# Patient Record
Sex: FEMALE | Race: WHITE | NOT HISPANIC OR LATINO | Employment: OTHER | URBAN - METROPOLITAN AREA
[De-identification: names, ages, dates, MRNs, and addresses within clinical notes are randomized per-mention and may not be internally consistent; named-entity substitution may affect disease eponyms.]

---

## 2017-02-13 ENCOUNTER — OFFICE VISIT (OUTPATIENT)
Dept: INTERNAL MEDICINE | Facility: CLINIC | Age: 47
End: 2017-02-13

## 2017-02-13 ENCOUNTER — APPOINTMENT (OUTPATIENT)
Dept: WOMENS IMAGING | Facility: HOSPITAL | Age: 47
End: 2017-02-13

## 2017-02-13 VITALS
BODY MASS INDEX: 24.3 KG/M2 | TEMPERATURE: 99.5 F | WEIGHT: 135 LBS | DIASTOLIC BLOOD PRESSURE: 64 MMHG | OXYGEN SATURATION: 99 % | SYSTOLIC BLOOD PRESSURE: 106 MMHG | HEART RATE: 90 BPM

## 2017-02-13 DIAGNOSIS — R05.9 COUGH: Primary | ICD-10-CM

## 2017-02-13 DIAGNOSIS — R50.9 FEVER, UNSPECIFIED FEVER CAUSE: ICD-10-CM

## 2017-02-13 DIAGNOSIS — J18.9 PNEUMONIA OF LEFT LUNG DUE TO INFECTIOUS ORGANISM, UNSPECIFIED PART OF LUNG: ICD-10-CM

## 2017-02-13 PROCEDURE — 99214 OFFICE O/P EST MOD 30 MIN: CPT | Performed by: NURSE PRACTITIONER

## 2017-02-13 PROCEDURE — 71020 XR CHEST 2 VW: CPT | Performed by: NURSE PRACTITIONER

## 2017-02-13 PROCEDURE — 71020 CHG CHEST X-RAY 2 VW: CPT | Performed by: RADIOLOGY

## 2017-02-13 RX ORDER — AZITHROMYCIN 250 MG/1
TABLET, FILM COATED ORAL
COMMUNITY
Start: 2017-02-11 | End: 2017-02-13

## 2017-02-13 RX ORDER — BENZONATATE 100 MG/1
1 CAPSULE ORAL EVERY 8 HOURS PRN
COMMUNITY
Start: 2017-02-11 | End: 2017-06-20

## 2017-02-13 RX ORDER — DEXTROMETHORPHAN HYDROBROMIDE AND PROMETHAZINE HYDROCHLORIDE 15; 6.25 MG/5ML; MG/5ML
5 SYRUP ORAL NIGHTLY PRN
COMMUNITY
Start: 2017-02-11 | End: 2017-06-20

## 2017-02-13 RX ORDER — LEVOFLOXACIN 500 MG/1
500 TABLET, FILM COATED ORAL DAILY
Qty: 5 TABLET | Refills: 0 | Status: SHIPPED | OUTPATIENT
Start: 2017-02-13 | End: 2017-02-18

## 2017-02-13 NOTE — PROGRESS NOTES
Subjective   Irma Esposito is a 46 y.o. female.     HPI Comments: She went to [facility] on 2/11/17 and was diagnosed bronchitis. She was tested for flu which was negative. She was started on zpak. She has a hx of pneumonia in childhood.     URI    This is a new problem. Episode onset: 5 days ago  The problem has been unchanged. The maximum temperature recorded prior to her arrival was 103 - 104 F. The fever has been present for 3 to 4 days. Associated symptoms include congestion, coughing, headaches and sinus pain. Pertinent negatives include no abdominal pain, chest pain, diarrhea, ear pain, nausea, neck pain, plugged ear sensation, rhinorrhea, sneezing, sore throat, vomiting or wheezing. She has tried acetaminophen (flonase, tessalon pearles, mucinex, promethazine cough suppressant, z-aroldo ) for the symptoms. The treatment provided mild relief.   Fever    This is a new problem. The temperature was taken using an oral thermometer. Associated symptoms include congestion, coughing and headaches. Pertinent negatives include no abdominal pain, chest pain, diarrhea, ear pain, nausea, sore throat, vomiting or wheezing. She has tried acetaminophen for the symptoms. The treatment provided mild relief.   Fatigue   This is a new problem. Episode onset: 5 days ago  The problem occurs constantly. Associated symptoms include congestion, coughing, fatigue, a fever, headaches and myalgias. Pertinent negatives include no abdominal pain, chest pain, chills, nausea, neck pain, sore throat or vomiting.   Headache    This is a new problem. The problem has been waxing and waning. The pain is located in the right unilateral region. The pain does not radiate. Associated symptoms include coughing, a fever and sinus pressure (?). Pertinent negatives include no abdominal pain, ear pain, hearing loss, nausea, neck pain, phonophobia, photophobia, rhinorrhea, sore throat, tinnitus or vomiting. The symptoms are aggravated by coughing  (standing up from sitting to standing ). She has tried acetaminophen and NSAIDs for the symptoms. The treatment provided mild relief.        The following portions of the patient's history were reviewed and updated as appropriate: allergies, current medications and problem list.    Review of Systems   Constitutional: Positive for fatigue and fever. Negative for appetite change and chills.   HENT: Positive for congestion, postnasal drip and sinus pressure (?). Negative for ear discharge, ear pain, facial swelling, hearing loss, rhinorrhea, sneezing, sore throat and tinnitus.    Eyes: Negative for photophobia.   Respiratory: Positive for cough. Negative for chest tightness, shortness of breath and wheezing.    Cardiovascular: Negative for chest pain, palpitations and leg swelling.   Gastrointestinal: Negative for abdominal pain, diarrhea, nausea and vomiting.   Musculoskeletal: Positive for myalgias. Negative for neck pain and neck stiffness.   Neurological: Positive for headaches.   Hematological: Negative for adenopathy.       Objective   Physical Exam   Constitutional: She appears well-developed and well-nourished. She is cooperative. She does not have a sickly appearance. She does not appear ill.   HENT:   Head: Normocephalic.   Right Ear: Hearing and external ear normal. No drainage, swelling or tenderness. No mastoid tenderness. Tympanic membrane is bulging. Tympanic membrane is not injected, not scarred and not erythematous. Tympanic membrane mobility is normal. No middle ear effusion. No decreased hearing is noted.   Left Ear: Hearing and external ear normal. No drainage, swelling or tenderness. No mastoid tenderness. Tympanic membrane is bulging. Tympanic membrane is not injected, not scarred and not erythematous. Tympanic membrane mobility is normal.  No middle ear effusion. No decreased hearing is noted.   Nose: Nose normal. No mucosal edema, rhinorrhea, sinus tenderness or nasal deformity. Right sinus  exhibits no maxillary sinus tenderness and no frontal sinus tenderness. Left sinus exhibits no maxillary sinus tenderness and no frontal sinus tenderness.   Mouth/Throat: Mucous membranes are normal. Normal dentition. Posterior oropharyngeal erythema present. No tonsillar exudate.   Eyes: Conjunctivae and lids are normal. Pupils are equal, round, and reactive to light. Right eye exhibits no discharge and no exudate. Left eye exhibits no discharge and no exudate.   Neck: Trachea normal and normal range of motion. No edema present. No thyroid mass and no thyromegaly present.   Cardiovascular: Regular rhythm, normal heart sounds and normal pulses.    No murmur heard.  Pulmonary/Chest: No respiratory distress. She has no decreased breath sounds. She has no wheezes. She has rhonchi in the right middle field. She has no rales.   Dry cough    Lymphadenopathy:        Head (right side): No submental, no submandibular, no tonsillar, no preauricular, no posterior auricular and no occipital adenopathy present.        Head (left side): No submental, no submandibular, no tonsillar, no preauricular, no posterior auricular and no occipital adenopathy present.   Neurological: She is alert.   Skin: Skin is warm, dry and intact. No cyanosis. Nails show no clubbing.       Assessment/Plan   Irma was seen today for uri, fever, fatigue and headache.    Diagnoses and all orders for this visit:    Cough  Comments:  continue with prescription cough suppressant prn  Orders:  -     XR Chest 2 View    Fever, unspecified fever cause  Comments:  ? pneumonia     Pneumonia of left lung due to infectious organism, unspecified part of lung  Comments:  continue with mucinex and prescription cough suppressant  Orders:  -     levoFLOXacin (LEVAQUIN) 500 MG tablet; Take 1 tablet by mouth Daily for 5 days.        CXR with questionable left infiltrate (reviewed with Dr Miranda). No comparison film available. Sent for prelim read (will probably need to  order CT scan).

## 2017-02-13 NOTE — PATIENT INSTRUCTIONS
Acute Bronchitis  Bronchitis is inflammation of the airways that extend from the windpipe into the lungs (bronchi). The inflammation often causes mucus to develop. This leads to a cough, which is the most common symptom of bronchitis.   In acute bronchitis, the condition usually develops suddenly and goes away over time, usually in a couple weeks. Smoking, allergies, and asthma can make bronchitis worse. Repeated episodes of bronchitis may cause further lung problems.   CAUSES  Acute bronchitis is most often caused by the same virus that causes a cold. The virus can spread from person to person (contagious) through coughing, sneezing, and touching contaminated objects.  SIGNS AND SYMPTOMS   · Cough.    · Fever.    · Coughing up mucus.    · Body aches.    · Chest congestion.    · Chills.    · Shortness of breath.    · Sore throat.    DIAGNOSIS   Acute bronchitis is usually diagnosed through a physical exam. Your health care provider will also ask you questions about your medical history. Tests, such as chest X-rays, are sometimes done to rule out other conditions.   TREATMENT   Acute bronchitis usually goes away in a couple weeks. Oftentimes, no medical treatment is necessary. Medicines are sometimes given for relief of fever or cough. Antibiotic medicines are usually not needed but may be prescribed in certain situations. In some cases, an inhaler may be recommended to help reduce shortness of breath and control the cough. A cool mist vaporizer may also be used to help thin bronchial secretions and make it easier to clear the chest.   HOME CARE INSTRUCTIONS  · Get plenty of rest.    · Drink enough fluids to keep your urine clear or pale yellow (unless you have a medical condition that requires fluid restriction). Increasing fluids may help thin your respiratory secretions (sputum) and reduce chest congestion, and it will prevent dehydration.    · Take medicines only as directed by your health care provider.  · If  you were prescribed an antibiotic medicine, finish it all even if you start to feel better.  · Avoid smoking and secondhand smoke. Exposure to cigarette smoke or irritating chemicals will make bronchitis worse. If you are a smoker, consider using nicotine gum or skin patches to help control withdrawal symptoms. Quitting smoking will help your lungs heal faster.    · Reduce the chances of another bout of acute bronchitis by washing your hands frequently, avoiding people with cold symptoms, and trying not to touch your hands to your mouth, nose, or eyes.    · Keep all follow-up visits as directed by your health care provider.    SEEK MEDICAL CARE IF:  Your symptoms do not improve after 1 week of treatment.   SEEK IMMEDIATE MEDICAL CARE IF:  · You develop an increased fever or chills.    · You have chest pain.    · You have severe shortness of breath.  · You have bloody sputum.    · You develop dehydration.  · You faint or repeatedly feel like you are going to pass out.  · You develop repeated vomiting.  · You develop a severe headache.  MAKE SURE YOU:   · Understand these instructions.  · Will watch your condition.  · Will get help right away if you are not doing well or get worse.     This information is not intended to replace advice given to you by your health care provider. Make sure you discuss any questions you have with your health care provider.     Document Released: 01/25/2006 Document Revised: 01/08/2016 Document Reviewed: 06/10/2014  [employer] Patient Education ©2016 [employer]

## 2017-02-15 DIAGNOSIS — J18.9 PNEUMONIA OF LEFT LUNG DUE TO INFECTIOUS ORGANISM, UNSPECIFIED PART OF LUNG: Primary | ICD-10-CM

## 2017-03-13 ENCOUNTER — APPOINTMENT (OUTPATIENT)
Dept: WOMENS IMAGING | Facility: HOSPITAL | Age: 47
End: 2017-03-13

## 2017-03-13 PROCEDURE — 71020 CHG CHEST X-RAY 2 VW: CPT | Performed by: RADIOLOGY

## 2017-06-19 ENCOUNTER — TELEPHONE (OUTPATIENT)
Dept: OBSTETRICS AND GYNECOLOGY | Age: 47
End: 2017-06-19

## 2017-06-19 NOTE — TELEPHONE ENCOUNTER
Called pt, reviewed u/s from [facility], she was not notified by [facility] of any finding, offered repeat u/s here and pt desires to proceed, note from [facility] advised repeat in 1 year but pt prefers to proceed now, will try to work in tomorrow

## 2017-06-20 ENCOUNTER — OFFICE VISIT (OUTPATIENT)
Dept: OBSTETRICS AND GYNECOLOGY | Age: 47
End: 2017-06-20

## 2017-06-20 ENCOUNTER — PROCEDURE VISIT (OUTPATIENT)
Dept: OBSTETRICS AND GYNECOLOGY | Age: 47
End: 2017-06-20

## 2017-06-20 ENCOUNTER — OFFICE VISIT (OUTPATIENT)
Dept: INTERNAL MEDICINE | Facility: CLINIC | Age: 47
End: 2017-06-20

## 2017-06-20 VITALS
BODY MASS INDEX: 23.57 KG/M2 | RESPIRATION RATE: 14 BRPM | SYSTOLIC BLOOD PRESSURE: 110 MMHG | DIASTOLIC BLOOD PRESSURE: 68 MMHG | WEIGHT: 133 LBS | HEIGHT: 63 IN

## 2017-06-20 VITALS
HEIGHT: 63 IN | WEIGHT: 131 LBS | BODY MASS INDEX: 23.21 KG/M2 | DIASTOLIC BLOOD PRESSURE: 70 MMHG | SYSTOLIC BLOOD PRESSURE: 98 MMHG

## 2017-06-20 DIAGNOSIS — N83.209 CYST OF OVARY, UNSPECIFIED LATERALITY: Primary | ICD-10-CM

## 2017-06-20 DIAGNOSIS — D75.89 MACROCYTOSIS: ICD-10-CM

## 2017-06-20 DIAGNOSIS — N83.202 CYST OF LEFT OVARY: Primary | ICD-10-CM

## 2017-06-20 DIAGNOSIS — Z00.00 ANNUAL PHYSICAL EXAM: Primary | ICD-10-CM

## 2017-06-20 LAB
ALBUMIN SERPL-MCNC: 4.64 G/DL (ref 3.4–4.6)
ALBUMIN/GLOB SERPL: 1.6 G/DL
ALP SERPL-CCNC: 62 U/L (ref 46–116)
ALT SERPL W P-5'-P-CCNC: 30 U/L (ref 14–59)
ANION GAP SERPL CALCULATED.3IONS-SCNC: 12 MMOL/L
APPEARANCE UR: CLEAR
AST SERPL-CCNC: 25 U/L (ref 7–37)
BASOPHILS # BLD AUTO: 0.01 10*3/MM3 (ref 0–0.2)
BASOPHILS NFR BLD AUTO: 0.2 % (ref 0–1.5)
BILIRUB SERPL-MCNC: 0.7 MG/DL (ref 0.2–1)
BILIRUB UR QL STRIP: NEGATIVE
BUN BLD-MCNC: 15 MG/DL (ref 6–22)
BUN/CREAT SERPL: 20.5 (ref 7–25)
CALCIUM SPEC-SCNC: 9.2 MG/DL (ref 8.6–10.5)
CHLORIDE SERPL-SCNC: 105 MMOL/L (ref 95–107)
CHOLEST SERPL-MCNC: 220 MG/DL (ref 0–200)
CO2 SERPL-SCNC: 28 MMOL/L (ref 23–32)
COLOR UR: YELLOW
CREAT BLD-MCNC: 0.73 MG/DL (ref 0.55–1.02)
EOSINOPHIL # BLD AUTO: 0.05 10*3/MM3 (ref 0–0.7)
EOSINOPHIL # BLD AUTO: 0.9 % (ref 0.3–6.2)
ERYTHROCYTE [DISTWIDTH] IN BLOOD BY AUTOMATED COUNT: 12.8 % (ref 11.7–13)
GFR SERPL CREATININE-BSD FRML MDRD: 85 ML/MIN/1.73
GLOBULIN UR ELPH-MCNC: 3 GM/DL
GLUCOSE BLD-MCNC: 112 MG/DL (ref 70–100)
GLUCOSE UR QL: NEGATIVE
HCT VFR BLD AUTO: 45.8 % (ref 35.6–45.5)
HDLC SERPL-MCNC: 93 MG/DL (ref 40–81)
HGB BLD-MCNC: 14.5 G/DL (ref 11.9–15.5)
HGB UR QL STRIP: NEGATIVE
IMM GRANULOCYTES # BLD: 0 10*3/MM3 (ref 0–0.03)
IMM GRANULOCYTES NFR BLD: 0 % (ref 0–0.5)
KETONES UR QL STRIP: NEGATIVE
LDLC SERPL CALC-MCNC: 114 MG/DL (ref 0–100)
LDLC/HDLC SERPL: 1.22 {RATIO}
LEUKOCYTE ESTERASE UR QL STRIP: NEGATIVE
LYMPHOCYTES # BLD AUTO: 1.4 10*3/MM3 (ref 0.9–4.8)
LYMPHOCYTES NFR BLD AUTO: 25.4 % (ref 19.6–45.3)
MCH RBC QN AUTO: 32.7 PG (ref 26.9–32)
MCHC RBC AUTO-ENTMCNC: 31.7 G/DL (ref 32.4–36.3)
MCV RBC AUTO: 103.2 FL (ref 80.5–98.2)
MONOCYTES # BLD AUTO: 0.5 10*3/MM3 (ref 0.2–1.2)
MONOCYTES NFR BLD AUTO: 9.1 % (ref 5–12)
NEUTROPHILS # BLD AUTO: 3.55 10*3/MM3 (ref 1.9–8.1)
NEUTROPHILS NFR BLD AUTO: 64.4 % (ref 42.7–76)
NITRITE UR QL STRIP: NEGATIVE
PH UR STRIP.AUTO: 7 [PH] (ref 5–8)
PLATELET # BLD AUTO: 232 10*3/MM3 (ref 140–500)
POTASSIUM BLD-SCNC: 4.3 MMOL/L (ref 3.3–5.3)
PROT SERPL-MCNC: 7.6 G/DL (ref 6.3–8.4)
PROTEIN: NEGATIVE
RBC # BLD AUTO: 4.44 10*6/MM3 (ref 3.9–5.2)
SODIUM BLD-SCNC: 145 MMOL/L (ref 136–145)
SP GR UR: 1.01 (ref 1–1.03)
TRIGL SERPL-MCNC: 66 MG/DL (ref 0–150)
TSH SERPL DL<=0.05 MIU/L-ACNC: 0.97 MIU/ML (ref 0.4–4.2)
UROBILINOGEN UR STRIP-MCNC: NORMAL MG/DL
VLDLC SERPL-MCNC: 13.2 MG/DL
WBC # BLD AUTO: 5.51 10*3/MM3 (ref 4.5–10.7)

## 2017-06-20 PROCEDURE — 76830 TRANSVAGINAL US NON-OB: CPT | Performed by: OBSTETRICS & GYNECOLOGY

## 2017-06-20 PROCEDURE — 80053 COMPREHEN METABOLIC PANEL: CPT | Performed by: INTERNAL MEDICINE

## 2017-06-20 PROCEDURE — 80061 LIPID PANEL: CPT | Performed by: INTERNAL MEDICINE

## 2017-06-20 PROCEDURE — 99396 PREV VISIT EST AGE 40-64: CPT | Performed by: INTERNAL MEDICINE

## 2017-06-20 PROCEDURE — 84443 ASSAY THYROID STIM HORMONE: CPT | Performed by: INTERNAL MEDICINE

## 2017-06-20 PROCEDURE — 99212 OFFICE O/P EST SF 10 MIN: CPT | Performed by: OBSTETRICS & GYNECOLOGY

## 2017-06-20 PROCEDURE — 93000 ELECTROCARDIOGRAM COMPLETE: CPT | Performed by: INTERNAL MEDICINE

## 2017-06-20 NOTE — PROGRESS NOTES
"Chief complaint:ovarian cyst     HPI  Irma Esposito is a 46 y.o. female. Pt with ovarian cyst noted on u/s at [facility] study. Pt was not notified but report noted left ovarian cyst 4.7 X 3.4 X 4.3 cm with septum. Plan per gyn onc was f/u 1 year. Contacted pt and offered repeat evaluation since it had been several months and pt desired to proceed rather than waiting a full year. She denies pelvic pain or abnormal bleeding. She is having irregular menses with occasional monthly cycles then will miss several months.         The following portions of the patient's history were reviewed and updated as appropriate: allergies, current medications, past family history, past medical history, past social history, past surgical history and problem list.    Review of Systems  Pertinent items are noted in HPI.    BP 98/70  Ht 62.5\" (158.8 cm)  Wt 131 lb (59.4 kg)  BMI 23.58 kg/m2    Objective   Physical Exam   Constitutional: She appears well-developed and well-nourished.   Psychiatric: She has a normal mood and affect. Her behavior is normal.     tv u/s: normal uterus with endometrial strip 6.5 mm, normal ovaries, left contains several small follicles, largest 7.7 X 6.5 mm, no free fluid or adnexal masses noted    Assessment/Plan   Irma was seen today for follow-up.    Diagnoses and all orders for this visit:    Cyst of left ovary      Cyst described by [facility] study resolved, normal ovaries today, pt asymptomatic  Recommended pt consider having My Risk done, she had BRCA testing in past but discussed adding additional studies and pt will continue, My Risk h/o given and pt will come in for draw if desired  She will f/u for annual in august or prn         "

## 2017-06-20 NOTE — PROGRESS NOTES
"Chief Complaint   Patient presents with   • Annual Exam        Subjective   Irma Esposito is a 46 y.o. female     HPI: She is here for annual examination.  She generally feels healthy.  Her  appetite is good.  She  is following a prudent diet. She is sleeping well.  Her  energy is good.   She exercises regularly.    Below right shoudler blade - tingling.  For about 3 months, off and on, No specific precipitating, aggravating or relieving factors.  Happens randomly. Area is fairly large.     Area on stomach - small bulge. Right side, upper abdomen, doesn't hurt.     Blug bites left ankle.     Tdap was given in 2009.       The following portions of the patient's history were reviewed and updated as appropriate: allergies, current medications, past social history, problem list, past surgical history    Review of Systems   Constitutional: Negative for appetite change, chills, fatigue, fever and unexpected weight change (she has lost some weight with diet and exercise).   HENT: Positive for tinnitus. Negative for congestion, ear pain, sinus pressure, sneezing, sore throat, trouble swallowing and voice change.    Eyes: Negative for visual disturbance.        Corneal abrasion \"years\" ago   Respiratory: Negative for cough and shortness of breath.    Cardiovascular: Negative for chest pain, palpitations and leg swelling.   Gastrointestinal: Positive for abdominal pain. Negative for constipation, diarrhea, nausea and vomiting.   Endocrine: Positive for cold intolerance. Negative for heat intolerance, polydipsia and polyuria.   Genitourinary: Positive for frequency. Negative for dysuria.   Musculoskeletal: Positive for arthralgias and back pain (low back off and on). Negative for gait problem.        Knees, elbows.  Glucosamine-chondroitin helps   Skin: Negative for rash.   Neurological: Negative for dizziness, syncope and headaches.   Hematological: Negative for adenopathy. Bruises/bleeds easily.   Psychiatric/Behavioral: " "Negative for decreased concentration, dysphoric mood and sleep disturbance. The patient is not nervous/anxious.            Objective     /68 (BP Location: Right arm, Patient Position: Sitting, Cuff Size: Adult)  Resp 14  Ht 62.5\" (158.8 cm)  Wt 133 lb (60.3 kg)  BMI 23.94 kg/m2     Physical Exam   Constitutional: She is oriented to person, place, and time. She appears well-developed and well-nourished. No distress.   HENT:   Right Ear: Hearing, tympanic membrane, external ear and ear canal normal.   Left Ear: Hearing, tympanic membrane, external ear and ear canal normal.   Nose: Right sinus exhibits no maxillary sinus tenderness and no frontal sinus tenderness. Left sinus exhibits no maxillary sinus tenderness and no frontal sinus tenderness.   Eyes: Conjunctivae, EOM and lids are normal. Pupils are equal, round, and reactive to light.   Neck: Trachea normal and phonation normal. Neck supple. Normal carotid pulses and no JVD present. Carotid bruit is not present. No thyroid mass and no thyromegaly present.   Cardiovascular: Normal rate, regular rhythm, S1 normal and S2 normal.  PMI is not displaced.  Exam reveals no gallop and no friction rub.    No murmur heard.  Pulses:       Carotid pulses are 2+ on the right side, and 2+ on the left side.       Radial pulses are 2+ on the right side, and 2+ on the left side.        Dorsalis pedis pulses are 2+ on the right side, and 2+ on the left side.   Pulmonary/Chest: Effort normal and breath sounds normal. She has no wheezes. She has no rhonchi. She has no rales. Chest wall is not dull to percussion.   Abdominal: Soft. Normal appearance, normal aorta and bowel sounds are normal. She exhibits no abdominal bruit and no mass. There is no hepatosplenomegaly. There is no tenderness.   Subtle asymmetry of upper abdomen with soft bulging in right upper abdomen   Musculoskeletal: Normal range of motion. She exhibits no edema, tenderness or deformity.   Lymphadenopathy:    "  She has no cervical adenopathy.     She has no axillary adenopathy.        Left: No supraclavicular adenopathy present.   Neurological: She is alert and oriented to person, place, and time. She has normal strength and normal reflexes. No cranial nerve deficit or sensory deficit. Coordination normal.   Skin: Skin is warm and dry. No rash noted.   Psychiatric: She has a normal mood and affect. Her speech is normal and behavior is normal. Judgment and thought content normal. Cognition and memory are normal. She is attentive.   Nursing note and vitals reviewed.      ECG 12 Lead  Date/Time: 6/20/2017 9:41 AM  Performed by: MARISOL YI  Authorized by: ROXANA SANDOVAL   Comparison: compared with previous ECG from 4/13/2015  Similar to previous ECG  Rhythm: sinus bradycardia  Rate: bradycardic  BPM: 51  Conduction: conduction normal  ST Segments: ST segments normal  T Waves: T waves normal  Clinical impression comment: Normal except for rate.  Compared with previous EKG, sinus bradycardia now present                 Assessment/Plan   Problem List Items Addressed This Visit     None      Visit Diagnoses     Annual physical exam    -  Primary    Relevant Orders    Comprehensive Metabolic Panel    CBC & Differential    Urinalysis With / Microscopic If Indicated    Lipid Panel    TSH        Likely asymmetry of subcutaneous fat causing subtle asymmetry of her upper abdomen.  Only visible when she is standing.

## 2017-06-21 LAB
FOLATE SERPL-MCNC: >20 NG/ML (ref 4.78–24.2)
VIT B12 SERPL-MCNC: 649 PG/ML (ref 211–946)

## 2017-08-25 ENCOUNTER — OFFICE VISIT (OUTPATIENT)
Dept: OBSTETRICS AND GYNECOLOGY | Age: 47
End: 2017-08-25

## 2017-08-25 VITALS
DIASTOLIC BLOOD PRESSURE: 60 MMHG | WEIGHT: 132 LBS | SYSTOLIC BLOOD PRESSURE: 100 MMHG | BODY MASS INDEX: 23.39 KG/M2 | HEIGHT: 63 IN

## 2017-08-25 DIAGNOSIS — Z01.419 VISIT FOR GYNECOLOGIC EXAMINATION: Primary | ICD-10-CM

## 2017-08-25 DIAGNOSIS — Z80.9 FAMILY HISTORY OF CANCER: ICD-10-CM

## 2017-08-25 DIAGNOSIS — Z11.51 SCREENING FOR HPV (HUMAN PAPILLOMAVIRUS): ICD-10-CM

## 2017-08-25 PROCEDURE — 99396 PREV VISIT EST AGE 40-64: CPT | Performed by: OBSTETRICS & GYNECOLOGY

## 2017-08-25 NOTE — PROGRESS NOTES
"Subjective     Chief Complaint   Patient presents with   • Annual Exam     no problems       History of Present Illness    Irma Esposito is a 47 y.o.  who presents for annual exam.  Her menses are irregular every 2 to 3 months, flow is light and no excessive or abnormal flow, lasting 4-7 days, dysmenorrhea none   Her spouse is not doing well, his cancer is not responding to treatment  Obstetric History:  OB History      Para Term  AB TAB SAB Ectopic Multiple Living    2 2 2       2         Menstrual History:     Patient's last menstrual period was 2017.         Current contraception: vasectomy  History of abnormal Pap smear: no  Received Gardasil immunization: no  Perform regular self breast exam: yes - occ  Family history of uterine or ovarian cancer: yes - mother with ovary  Family History of colon cancer: yes - mother colon cancer, PGF colon cancer  Family history of breast cancer: yes - mat great aunt    Mammogram: ordered.  Colonoscopy: up to date.  DEXA: not indicated.    Exercise: moderately active  Calcium/Vitamin D: adequate intake    The following portions of the patient's history were reviewed and updated as appropriate: allergies, current medications, past family history, past medical history, past social history, past surgical history and problem list.    Review of Systems    Review of Systems   Constitutional: Negative for fatigue.   Respiratory: Negative for shortness of breath.    Gastrointestinal: Negative for abdominal pain.   Genitourinary: Negative for dysuria. positive for irregular menses but light flow every few months, no pelvic pain  Neurological: Negative for headaches.   Psychiatric/Behavioral: Negative for dysphoric mood.         Objective   Physical Exam    /60  Ht 62.5\" (158.8 cm)  Wt 132 lb (59.9 kg)  LMP 2017  BMI 23.76 kg/m2    General:   alert, appears stated age, cooperative and no distress   Neck: no asymmetry, masses, or scars "   Heart: regular rate and rhythm, S1, S2 normal, no murmur, click, rub or gallop   Lungs: clear to auscultation bilaterally   Abdomen: soft, non-tender, without masses or organomegaly   Breast: inspection negative, no nipple discharge or bleeding, no masses or nodularity palpable   Vulva: normal, Bartholin's, Urethra, Scotts's normal   Vagina: normal mucosa, normal discharge   Cervix: no lesions   Uterus: normal size, mobile, non-tender, normal shape and consistency   Adnexa: normal adnexa and no mass, fullness, tenderness   Rectal: not indicated     Assessment/Plan   Irma was seen today for annual exam.    Diagnoses and all orders for this visit:    Visit for gynecologic examination  -     IGP, Apt HPV,rfx 16 / 18,45    Screening for HPV (human papillomavirus)  -     IGP, Apt HPV,rfx 16 / 18,45    Family history of cancer  -     [facility] Hereditary Cancer Screen        All questions answered.  Breast self exam technique reviewed and patient encouraged to perform self-exam monthly.  Discussed healthy lifestyle modifications.  Recommended 30 minutes of aerobic exercise five times per week.    Pt wants to proceed with My risk testing, she has already had negative BRCA but history unusual as mother with 2 primary cancers and paternal side with colon cancer, she will proceed and plan f/u 3 weeks to discuss  Pt has routine screening of ovary with u/s at  and here, had one here in June 2017 due to cyst noted at , no significant cysts were noted here with f/u, pt plans to have f/u screening at  around January so will f/u here for screening q 6 months

## 2017-08-29 LAB
CYTOLOGIST CVX/VAG CYTO: NORMAL
CYTOLOGY CVX/VAG DOC THIN PREP: NORMAL
DX ICD CODE: NORMAL
HIV 1 & 2 AB SER-IMP: NORMAL
HPV I/H RISK 4 DNA CVX QL PROBE+SIG AMP: NEGATIVE
OTHER STN SPEC: NORMAL
PATH REPORT.FINAL DX SPEC: NORMAL
STAT OF ADQ CVX/VAG CYTO-IMP: NORMAL

## 2017-09-29 ENCOUNTER — OFFICE VISIT (OUTPATIENT)
Dept: OBSTETRICS AND GYNECOLOGY | Age: 47
End: 2017-09-29

## 2017-09-29 VITALS
BODY MASS INDEX: 23.04 KG/M2 | WEIGHT: 130 LBS | SYSTOLIC BLOOD PRESSURE: 100 MMHG | HEIGHT: 63 IN | DIASTOLIC BLOOD PRESSURE: 64 MMHG

## 2017-09-29 DIAGNOSIS — Z80.0 FAMILY HISTORY OF COLON CANCER: ICD-10-CM

## 2017-09-29 DIAGNOSIS — Z80.41 FAMILY HISTORY OF OVARIAN CANCER: Primary | ICD-10-CM

## 2017-09-29 PROCEDURE — 99212 OFFICE O/P EST SF 10 MIN: CPT | Performed by: OBSTETRICS & GYNECOLOGY

## 2017-09-29 NOTE — PROGRESS NOTES
"Chief complaint:here for results     HPI  Irma Esposito is a 47 y.o. female. P t here for results f/u. Her spouse  3 weeks ago after carroll with cancer        The following portions of the patient's history were reviewed and updated as appropriate: allergies, current medications, past family history, past medical history, past social history, past surgical history and problem list.    Review of Systems  Pertinent items are noted in HPI.    /64  Ht 62.5\" (158.8 cm)  Wt 130 lb (59 kg)  BMI 23.4 kg/m2    Objective   Physical Exam   Constitutional: She appears well-developed and well-nourished.   Psychiatric: She has a normal mood and affect. Her behavior is normal. Thought content normal.     My Risk results discussed: negative for clinically significant mutation and negative for VUS, recommended modified management is for colon screening which pt does  Assessment/Plan   Irma was seen today for follow-up.    Diagnoses and all orders for this visit:    Family history of ovarian cancer    Family history of colon cancer    negative genetic tests, plan ongoing surveillance with pelvic u/s q 6 months, pt had normal u/s in late 2017, pt will have next u/s at  around January so will do next imaging here with annual next year           "

## 2018-01-05 ENCOUNTER — OFFICE VISIT (OUTPATIENT)
Dept: INTERNAL MEDICINE | Facility: CLINIC | Age: 48
End: 2018-01-05

## 2018-01-05 VITALS
DIASTOLIC BLOOD PRESSURE: 70 MMHG | TEMPERATURE: 98 F | WEIGHT: 133 LBS | HEIGHT: 63 IN | HEART RATE: 97 BPM | OXYGEN SATURATION: 98 % | BODY MASS INDEX: 23.57 KG/M2 | SYSTOLIC BLOOD PRESSURE: 110 MMHG

## 2018-01-05 DIAGNOSIS — H92.01 OTALGIA, RIGHT: ICD-10-CM

## 2018-01-05 DIAGNOSIS — J06.9 ACUTE URI: Primary | ICD-10-CM

## 2018-01-05 PROCEDURE — 99213 OFFICE O/P EST LOW 20 MIN: CPT | Performed by: NURSE PRACTITIONER

## 2018-01-05 RX ORDER — AZITHROMYCIN 250 MG/1
TABLET, FILM COATED ORAL
Qty: 6 TABLET | Refills: 0 | Status: SHIPPED | OUTPATIENT
Start: 2018-01-05 | End: 2018-06-22

## 2018-01-05 RX ORDER — GUAIFENESIN 600 MG/1
1200 TABLET, EXTENDED RELEASE ORAL 2 TIMES DAILY
COMMUNITY
End: 2018-01-05 | Stop reason: SDUPTHER

## 2018-01-05 RX ORDER — GUAIFENESIN 600 MG/1
1200 TABLET, EXTENDED RELEASE ORAL EVERY 12 HOURS SCHEDULED
Qty: 60 TABLET | Refills: 0
Start: 2018-01-05 | End: 2018-01-12

## 2018-01-05 RX ORDER — FLUTICASONE PROPIONATE 50 MCG
2 SPRAY, SUSPENSION (ML) NASAL DAILY
COMMUNITY
End: 2021-12-13

## 2018-01-05 NOTE — PROGRESS NOTES
Subjective   Irma Esposito is a 47 y.o. female who presents due to respiratory symptoms.    HPI Comments: Influenza swab was negative on Wednesday at [facility].    Earache    There is pain in the right ear. This is a new problem. The current episode started in the past 7 days. Associated symptoms include coughing (not productive) and a sore throat. Pertinent negatives include no abdominal pain, diarrhea, headaches, hearing loss, rhinorrhea or vomiting.   URI    This is a new problem. The current episode started in the past 7 days. The problem has been rapidly worsening. The maximum temperature recorded prior to her arrival was 100.4 - 100.9 F (felt feverish on Wednesday). The fever has been present for 1 to 2 days. Associated symptoms include congestion, coughing (not productive), ear pain (right ear), sneezing and a sore throat. Pertinent negatives include no abdominal pain, chest pain, diarrhea, dysuria, headaches, nausea, rhinorrhea, vomiting or wheezing. She has tried acetaminophen, antihistamine and decongestant (Flonase, Mucinex) for the symptoms. The treatment provided mild relief.        The following portions of the patient's history were reviewed and updated as appropriate: allergies, current medications, past social history and problem list.    Past Medical History:   Diagnosis Date   • Hypernatremia          Current Outpatient Prescriptions:   •  fexofenadine (ALLEGRA) 180 MG tablet, Take 180 mg by mouth daily., Disp: , Rfl:   •  fluticasone (FLONASE) 50 MCG/ACT nasal spray, 2 sprays into each nostril Daily., Disp: , Rfl:   •  Glucosamine-Chondroitin (GLUCOSAMINE CHONDR COMPLEX PO), Take 1 tablet by mouth Daily., Disp: , Rfl:   •  guaiFENesin (MUCINEX) 600 MG 12 hr tablet, Take 2 tablets by mouth Every 12 (Twelve) Hours for 7 days., Disp: 60 tablet, Rfl: 0  •  Melatonin 10 MG tablet, Take 1 tablet by mouth At Night As Needed. Insomnia , Disp: , Rfl:   •  Multiple Vitamins-Minerals (MULTIVITAMIN  "ADULT PO), Take 1 tablet by mouth Daily., Disp: , Rfl:   •  RESVERATROL PO, Take  by mouth., Disp: , Rfl:   •  Turmeric 500 MG capsule, Take 500 mg by mouth Daily., Disp: , Rfl:   •  azithromycin (ZITHROMAX Z-YAQUELIN) 250 MG tablet, Take 2 tablets the first day, then 1 tablet daily for 4 days., Disp: 6 tablet, Rfl: 0    Allergies   Allergen Reactions   • Other      Seasonal   • Penicillins        Review of Systems   Constitutional: Positive for chills, fatigue and fever. Negative for activity change, appetite change and unexpected weight change.   HENT: Positive for congestion, ear pain (right ear), postnasal drip, sinus pressure, sneezing and sore throat. Negative for drooling, facial swelling, hearing loss, mouth sores, nosebleeds, rhinorrhea, trouble swallowing and voice change.    Eyes: Negative for pain, discharge, itching and visual disturbance.   Respiratory: Positive for cough (not productive) and chest tightness. Negative for choking, shortness of breath and wheezing.    Cardiovascular: Negative for chest pain and palpitations.   Gastrointestinal: Negative for abdominal pain, constipation, diarrhea, nausea and vomiting.   Endocrine: Negative for polyuria.   Genitourinary: Negative for dysuria and frequency.   Musculoskeletal: Negative for back pain and joint swelling.   Neurological: Negative for headaches.       Objective   Vitals:    01/05/18 0751   BP: 110/70   BP Location: Left arm   Patient Position: Sitting   Cuff Size: Adult   Pulse: 97   Temp: 98 °F (36.7 °C)   TempSrc: Tympanic   SpO2: 98%   Weight: 60.3 kg (133 lb)   Height: 158.8 cm (62.5\")     Physical Exam   Constitutional: She appears well-developed and well-nourished. She is cooperative. She does not have a sickly appearance. She does not appear ill.   HENT:   Head: Normocephalic.   Right Ear: Hearing and external ear normal. No drainage, swelling or tenderness. Tympanic membrane is bulging. Tympanic membrane is not erythematous. No middle ear " effusion. No decreased hearing is noted.   Left Ear: Hearing and external ear normal. No drainage, swelling or tenderness. Tympanic membrane is bulging. Tympanic membrane is not erythematous.  No middle ear effusion. No decreased hearing is noted.   Nose: Nose normal. No mucosal edema, rhinorrhea, sinus tenderness or nasal deformity. Right sinus exhibits no maxillary sinus tenderness and no frontal sinus tenderness. Left sinus exhibits no maxillary sinus tenderness and no frontal sinus tenderness.   Mouth/Throat: Mucous membranes are normal. Posterior oropharyngeal erythema present.   Eyes: Conjunctivae and lids are normal.   Neck: Trachea normal.   Cardiovascular: Regular rhythm, normal heart sounds and normal pulses.    No murmur heard.  Pulmonary/Chest: Breath sounds normal. No respiratory distress. She has no decreased breath sounds. She has no wheezes. She has no rhonchi. She has no rales.   Lymphadenopathy:     She has no cervical adenopathy.   Neurological: She is alert.   Skin: Skin is warm, dry and intact.   Nursing note and vitals reviewed.      Assessment/Plan   Irma was seen today for earache, nasal congestion, fever and chills.    Diagnoses and all orders for this visit:    Acute URI  Comments:  continue Allegra & Flonase  Orders:  -     azithromycin (ZITHROMAX Z-YAQUELIN) 250 MG tablet; Take 2 tablets the first day, then 1 tablet daily for 4 days.  -     guaiFENesin (MUCINEX) 600 MG 12 hr tablet; Take 2 tablets by mouth Every 12 (Twelve) Hours for 7 days.    Otalgia, right  Comments:  nl exam, most like due to inner ear pressure-continue to monitor

## 2018-03-07 ENCOUNTER — TELEPHONE (OUTPATIENT)
Dept: OBSTETRICS AND GYNECOLOGY | Age: 48
End: 2018-03-07

## 2018-03-07 NOTE — TELEPHONE ENCOUNTER
Dr Crawford pt (pt ok to wait til Friday) gets a cancer screening US done at , she had this done a month ago and is asking if we received the results? Nothing has been scanned in.

## 2018-03-07 NOTE — TELEPHONE ENCOUNTER
Please call pt, I have not received a report and I cannot find it in the media section. Please call [facility] and obtain report, I will review on Friday.

## 2018-06-22 ENCOUNTER — OFFICE VISIT (OUTPATIENT)
Dept: INTERNAL MEDICINE | Facility: CLINIC | Age: 48
End: 2018-06-22

## 2018-06-22 VITALS
WEIGHT: 134 LBS | BODY MASS INDEX: 23.74 KG/M2 | DIASTOLIC BLOOD PRESSURE: 62 MMHG | HEIGHT: 63 IN | SYSTOLIC BLOOD PRESSURE: 94 MMHG

## 2018-06-22 DIAGNOSIS — Z00.00 ANNUAL PHYSICAL EXAM: Primary | ICD-10-CM

## 2018-06-22 LAB
ALBUMIN SERPL-MCNC: 4.8 G/DL (ref 3.5–5.2)
ALBUMIN/GLOB SERPL: 2.3 G/DL
ALP SERPL-CCNC: 58 U/L (ref 39–117)
ALT SERPL-CCNC: 14 U/L (ref 1–33)
AST SERPL-CCNC: 17 U/L (ref 1–32)
BASOPHILS # BLD AUTO: 0.01 10*3/MM3 (ref 0–0.2)
BASOPHILS NFR BLD AUTO: 0.2 % (ref 0–2)
BILIRUB SERPL-MCNC: 0.5 MG/DL (ref 0.1–1.2)
BILIRUB UR QL STRIP: NEGATIVE
BUN SERPL-MCNC: 11 MG/DL (ref 6–20)
BUN/CREAT SERPL: 14.3 (ref 7–25)
CALCIUM SERPL-MCNC: 9.8 MG/DL (ref 8.6–10.5)
CHLORIDE SERPL-SCNC: 100 MMOL/L (ref 98–107)
CHOLEST SERPL-MCNC: 205 MG/DL (ref 0–200)
CLARITY UR: CLEAR
CO2 SERPL-SCNC: 27.7 MMOL/L (ref 22–29)
COLOR UR: YELLOW
CREAT SERPL-MCNC: 0.77 MG/DL (ref 0.57–1)
DEPRECATED RDW RBC AUTO: 42.7 FL (ref 37–54)
EOSINOPHIL # BLD AUTO: 0.03 10*3/MM3 (ref 0–0.7)
EOSINOPHIL NFR BLD AUTO: 0.6 % (ref 0–5)
ERYTHROCYTE [DISTWIDTH] IN BLOOD BY AUTOMATED COUNT: 12 % (ref 11.5–15)
GLOBULIN SER CALC-MCNC: 2.1 GM/DL
GLUCOSE SERPL-MCNC: 105 MG/DL (ref 65–99)
GLUCOSE UR STRIP-MCNC: NEGATIVE MG/DL
HCT VFR BLD AUTO: 42 % (ref 34.1–44.9)
HDLC SERPL-MCNC: 83 MG/DL (ref 40–60)
HGB BLD-MCNC: 13.9 G/DL (ref 11.2–15.7)
HGB UR QL STRIP.AUTO: NEGATIVE
KETONES UR QL STRIP: NEGATIVE
LDLC SERPL CALC-MCNC: 104 MG/DL (ref 0–100)
LEUKOCYTE ESTERASE UR QL STRIP.AUTO: NEGATIVE
LYMPHOCYTES # BLD AUTO: 1.47 10*3/MM3 (ref 0.8–7)
LYMPHOCYTES NFR BLD AUTO: 31.7 % (ref 10–60)
MCH RBC QN AUTO: 32.6 PG (ref 26–34)
MCHC RBC AUTO-ENTMCNC: 33.1 G/DL (ref 31–37)
MCV RBC AUTO: 98.6 FL (ref 80–100)
MONOCYTES # BLD AUTO: 0.39 10*3/MM3 (ref 0–1)
MONOCYTES NFR BLD AUTO: 8.4 % (ref 0–13)
NEUTROPHILS # BLD AUTO: 2.73 10*3/MM3 (ref 1–11)
NEUTROPHILS NFR BLD AUTO: 59.1 % (ref 30–85)
NITRITE UR QL STRIP: NEGATIVE
PH UR STRIP.AUTO: 7 [PH] (ref 5–8)
PLATELET # BLD AUTO: 192 10*3/MM3 (ref 150–450)
PMV BLD AUTO: 11.9 FL (ref 6–12)
POTASSIUM SERPL-SCNC: 4.8 MMOL/L (ref 3.5–5.2)
PROT SERPL-MCNC: 6.9 G/DL (ref 6–8.5)
PROT UR QL STRIP: NEGATIVE
RBC # BLD AUTO: 4.26 10*6/MM3 (ref 3.93–5.22)
SODIUM SERPL-SCNC: 142 MMOL/L (ref 136–145)
SP GR UR STRIP: 1.01 (ref 1–1.03)
TRIGL SERPL-MCNC: 88 MG/DL (ref 0–150)
TSH SERPL-ACNC: 1.18 MIU/ML (ref 0.27–4.2)
UROBILINOGEN UR QL STRIP: NORMAL
VLDLC SERPL-MCNC: 17.6 MG/DL (ref 5–40)
WBC NRBC COR # BLD: 4.63 10*3/MM3 (ref 5–10)

## 2018-06-22 PROCEDURE — 36415 COLL VENOUS BLD VENIPUNCTURE: CPT | Performed by: INTERNAL MEDICINE

## 2018-06-22 PROCEDURE — 85025 COMPLETE CBC W/AUTO DIFF WBC: CPT | Performed by: INTERNAL MEDICINE

## 2018-06-22 PROCEDURE — 81003 URINALYSIS AUTO W/O SCOPE: CPT | Performed by: INTERNAL MEDICINE

## 2018-06-22 PROCEDURE — 99396 PREV VISIT EST AGE 40-64: CPT | Performed by: INTERNAL MEDICINE

## 2018-06-22 NOTE — PROGRESS NOTES
Chief Complaint   Patient presents with   • Annual Exam     physical       Subjective   Irma Esposito is a 47 y.o. female.     History of Present Illness     She is here for annual examination.    She generally feels healthy.    Her  appetite is good.    She  follows a prudent diet.   She sleeps fairly well.  Her  energy is good.     She exercises - walks the dog.  She is up to date on dental exams, eye exams, gyn exams             The following portions of the patient's history were reviewed and updated as appropriate: allergies, current medications, past family history, past medical history, past social history, past surgical history and problem list.    Review of Systems   Constitutional: Negative for appetite change, chills, fatigue, fever, unexpected weight gain and unexpected weight loss.   HENT: Negative for congestion, sinus pressure, sore throat, tinnitus, trouble swallowing and voice change.    Eyes: Positive for visual disturbance (cataracts). Negative for blurred vision and double vision.   Respiratory: Negative for cough and shortness of breath.    Cardiovascular: Negative for chest pain, palpitations and leg swelling.   Gastrointestinal: Negative for abdominal pain, constipation, diarrhea, nausea, vomiting and indigestion.   Endocrine: Negative for cold intolerance, heat intolerance, polydipsia and polyuria.   Genitourinary: Negative for frequency.   Musculoskeletal: Negative for arthralgias and back pain.   Skin: Negative for rash.   Neurological: Negative for dizziness.   Hematological: Negative for adenopathy. Bruises/bleeds easily (no change, bruises easily).   Psychiatric/Behavioral: Negative for depressed mood. The patient is not nervous/anxious.          Current Outpatient Prescriptions:   •  fexofenadine (ALLEGRA) 180 MG tablet, Take 180 mg by mouth daily., Disp: , Rfl:   •  fluticasone (FLONASE) 50 MCG/ACT nasal spray, 2 sprays into each nostril Daily., Disp: , Rfl:   •   "Glucosamine-Chondroitin (GLUCOSAMINE CHONDR COMPLEX PO), Take 1 tablet by mouth Daily., Disp: , Rfl:   •  Melatonin 10 MG tablet, Take 1 tablet by mouth At Night As Needed. Insomnia , Disp: , Rfl:   •  Multiple Vitamins-Minerals (MULTIVITAMIN ADULT PO), Take 1 tablet by mouth Daily., Disp: , Rfl:   •  RESVERATROL PO, Take  by mouth., Disp: , Rfl:   •  Turmeric 500 MG capsule, Take 500 mg by mouth Daily., Disp: , Rfl:         Objective     BP 94/62   Ht 158.8 cm (62.5\")   Wt 60.8 kg (134 lb)   BMI 24.12 kg/m²     Physical Exam   Constitutional: She is oriented to person, place, and time. She appears well-developed and well-nourished. No distress.   HENT:   Right Ear: Tympanic membrane and ear canal normal.   Left Ear: Tympanic membrane and ear canal normal.   Nose: Right sinus exhibits no maxillary sinus tenderness and no frontal sinus tenderness. Left sinus exhibits no maxillary sinus tenderness and no frontal sinus tenderness.   Mouth/Throat: Oropharynx is clear and moist.   Eyes: Conjunctivae and EOM are normal. Pupils are equal, round, and reactive to light. No scleral icterus.   Neck: Normal range of motion. Neck supple. Normal carotid pulses present. Carotid bruit is not present. No tracheal deviation present. No thyromegaly present.   Cardiovascular: Regular rhythm, S1 normal, S2 normal and intact distal pulses.  Exam reveals no gallop and no friction rub.    No murmur heard.  Pulses:       Carotid pulses are 2+ on the right side, and 2+ on the left side.  Pulmonary/Chest: Effort normal and breath sounds normal. She has no wheezes. She has no rhonchi. She has no rales. Chest wall is not dull to percussion.   Abdominal: Soft. Normal appearance and bowel sounds are normal. She exhibits no abdominal bruit. There is no hepatosplenomegaly. There is no tenderness. There is no rebound and no guarding.   Musculoskeletal: She exhibits no edema.   Lymphadenopathy:     She has no cervical adenopathy.   Neurological: " She is alert and oriented to person, place, and time. No cranial nerve deficit. Coordination normal.   Skin: Skin is warm and dry.   Psychiatric: She has a normal mood and affect.   Nursing note and vitals reviewed.        Assessment/Plan   Irma was seen today for annual exam.    Diagnoses and all orders for this visit:    Annual physical exam  -     Comprehensive Metabolic Panel; Future  -     CBC & Differential; Future  -     Urinalysis With / Microscopic If Indicated (No Culture) - Urine, Clean Catch; Future  -     Lipid Panel; Future  -     TSH Rfx On Abnormal To Free T4; Future    Encouraged her to continue prudent diet, regular exercise.

## 2018-06-25 ENCOUNTER — PREP FOR SURGERY (OUTPATIENT)
Dept: OTHER | Facility: HOSPITAL | Age: 48
End: 2018-06-25

## 2018-06-25 DIAGNOSIS — Z80.41 FAMILY HISTORY OF OVARIAN CANCER: ICD-10-CM

## 2018-06-25 DIAGNOSIS — Z12.11 COLON CANCER SCREENING: Primary | ICD-10-CM

## 2018-06-25 DIAGNOSIS — Z80.0 FAMILY HISTORY OF COLON CANCER: ICD-10-CM

## 2018-06-27 PROBLEM — Z12.11 COLON CANCER SCREENING: Status: ACTIVE | Noted: 2018-06-27

## 2018-08-27 ENCOUNTER — APPOINTMENT (OUTPATIENT)
Dept: WOMENS IMAGING | Facility: HOSPITAL | Age: 48
End: 2018-08-27

## 2018-08-27 ENCOUNTER — OFFICE VISIT (OUTPATIENT)
Dept: OBSTETRICS AND GYNECOLOGY | Age: 48
End: 2018-08-27

## 2018-08-27 ENCOUNTER — PROCEDURE VISIT (OUTPATIENT)
Dept: OBSTETRICS AND GYNECOLOGY | Age: 48
End: 2018-08-27

## 2018-08-27 VITALS
DIASTOLIC BLOOD PRESSURE: 68 MMHG | SYSTOLIC BLOOD PRESSURE: 108 MMHG | WEIGHT: 138.4 LBS | HEIGHT: 63 IN | BODY MASS INDEX: 24.52 KG/M2

## 2018-08-27 DIAGNOSIS — Z01.419 ENCOUNTER FOR GYNECOLOGICAL EXAMINATION: Primary | ICD-10-CM

## 2018-08-27 DIAGNOSIS — N83.209 CYST OF OVARY, UNSPECIFIED LATERALITY: Primary | ICD-10-CM

## 2018-08-27 DIAGNOSIS — Z80.41 FAMILY HISTORY OF OVARIAN CANCER: ICD-10-CM

## 2018-08-27 PROCEDURE — 77067 SCR MAMMO BI INCL CAD: CPT | Performed by: RADIOLOGY

## 2018-08-27 PROCEDURE — 99396 PREV VISIT EST AGE 40-64: CPT | Performed by: OBSTETRICS & GYNECOLOGY

## 2018-08-27 PROCEDURE — 76830 TRANSVAGINAL US NON-OB: CPT | Performed by: OBSTETRICS & GYNECOLOGY

## 2018-08-27 NOTE — PROGRESS NOTES
"Subjective     Chief Complaint   Patient presents with   • Gynecologic Exam     Annual exam  last pap 17-, HPV-, MG       History of Present Illness    Irma Esposito is a 48 y.o.  who presents for annual exam and pelvic u/s  Her LMP was around April and she has had no bleeding since; the prior episode of bleeding was last ; she has occasional hot flashes  She and sons are getting by after death of her  last fall    Obstetric History:  OB History      Para Term  AB Living    2 2 2     2    SAB TAB Ectopic Molar Multiple Live Births              2         Menstrual History:     No LMP recorded (lmp unknown). Patient is perimenopausal.         Current contraception: abstinence  History of abnormal Pap smear: no  Received Gardasil immunization: no  Perform regular self breast exam: yes - regularly  Family history of uterine or ovarian cancer: yes - mother with ovarian  Family History of colon cancer: yes - mother  Family history of breast cancer: yes - great aunt-postmenopausal    Mammogram: scheduled today  Colonoscopy: up to date.  DEXA: not indicated.    Exercise: moderately active  Calcium/Vitamin D: adequate intake    The following portions of the patient's history were reviewed and updated as appropriate: allergies, current medications, past family history, past medical history, past social history, past surgical history and problem list.    Review of Systems    Review of Systems   Constitutional: Negative for fatigue.   Respiratory: Negative for shortness of breath.    Gastrointestinal: Negative for abdominal pain.   Genitourinary: Negative for dysuria. pos for missed menses  Neurological: Negative for headaches.   Psychiatric/Behavioral: Negative for dysphoric mood.         Objective   Physical Exam    /68   Ht 158.8 cm (62.5\")   Wt 62.8 kg (138 lb 6.4 oz)   LMP  (LMP Unknown)   BMI 24.91 kg/m²     General:   alert, appears stated age, cooperative and no " distress   Neck: no asymmetry, masses, or scars and thyroid normal to palpation   Heart: regular rate and rhythm, S1, S2 normal, no murmur, click, rub or gallop   Lungs: clear to auscultation bilaterally   Abdomen: soft, non-tender, without masses or organomegaly   Breast: inspection negative, no nipple discharge or bleeding, no masses or nodularity palpable and no axillary adenopathy   Vulva: normal, Bartholin's, Urethra, Colusa's normal   Vagina: normal mucosa, normal discharge   Cervix: no lesions   Uterus: normal size, mobile, non-tender, normal shape and consistency   Adnexa: normal adnexa and no mass, fullness, tenderness   Rectal: normal rectal, no masses and guaiac negative stool obtained     tv u/s: normal uterus, endometrium 6.9 mm, normal ovaries    Assessment/Plan   Irma was seen today for gynecologic exam.    Diagnoses and all orders for this visit:    Encounter for gynecological examination    Family history of ovarian cancer        All questions answered.  Breast self exam technique reviewed and patient encouraged to perform self-exam monthly.  Discussed healthy lifestyle modifications.  Recommended 30 minutes of aerobic exercise five times per week.  Discussed calcium/ vit D needs to prevent osteoporosis.  Will defer pap since negative pap and hpv last 2 years    Pt has pelvic u/s twice yearly due to family hx-one with [facility] study and other with annual

## 2018-09-13 ENCOUNTER — OFFICE VISIT (OUTPATIENT)
Dept: INTERNAL MEDICINE | Facility: CLINIC | Age: 48
End: 2018-09-13

## 2018-09-13 VITALS
RESPIRATION RATE: 14 BRPM | SYSTOLIC BLOOD PRESSURE: 110 MMHG | DIASTOLIC BLOOD PRESSURE: 74 MMHG | TEMPERATURE: 97.9 F | WEIGHT: 137 LBS | BODY MASS INDEX: 24.27 KG/M2 | HEIGHT: 63 IN

## 2018-09-13 DIAGNOSIS — B02.9 HERPES ZOSTER WITHOUT COMPLICATION: Primary | ICD-10-CM

## 2018-09-13 PROCEDURE — 99213 OFFICE O/P EST LOW 20 MIN: CPT | Performed by: NURSE PRACTITIONER

## 2018-09-13 RX ORDER — VALACYCLOVIR HYDROCHLORIDE 1 G/1
1000 TABLET, FILM COATED ORAL 3 TIMES DAILY
Qty: 21 TABLET | Refills: 0 | Status: SHIPPED | OUTPATIENT
Start: 2018-09-13 | End: 2018-09-20

## 2018-09-13 NOTE — PATIENT INSTRUCTIONS
Shingles  Shingles, which is also known as herpes zoster, is an infection that causes a painful skin rash and fluid-filled blisters. Shingles is not related to genital herpes, which is a sexually transmitted infection.  Shingles only develops in people who:  · Have had chickenpox.  · Have received the chickenpox vaccine. (This is rare.)    What are the causes?  Shingles is caused by varicella-zoster virus (VZV). This is the same virus that causes chickenpox. After exposure to VZV, the virus stays in the body in an inactive (dormant) state. Shingles develops if the virus reactivates. This can happen many years after the initial exposure to VZV. It is not known what causes this virus to reactivate.  What increases the risk?  People who have had chickenpox or received the chickenpox vaccine are at risk for shingles. Infection is more common in people who:  · Are older than age 50.  · Have a weakened defense (immune) system, such as those with HIV, AIDS, or cancer.  · Are taking medicines that weaken the immune system, such as transplant medicines.  · Are under great stress.    What are the signs or symptoms?  Early symptoms of this condition include itching, tingling, and pain in an area on your skin. Pain may be described as burning, stabbing, or throbbing.  A few days or weeks after symptoms start, a painful red rash appears, usually on one side of the body in a bandlike or beltlike pattern. The rash eventually turns into fluid-filled blisters that break open, scab over, and dry up in about 2-3 weeks.  At any time during the infection, you may also develop:  · A fever.  · Chills.  · A headache.  · An upset stomach.    How is this diagnosed?  This condition is diagnosed with a skin exam. Sometimes, skin or fluid samples are taken from the blisters before a diagnosis is made. These samples are examined under a microscope or sent to a lab for testing.  How is this treated?  There is no specific cure for this condition.  Your health care provider will probably prescribe medicines to help you manage pain, recover more quickly, and avoid long-term problems. Medicines may include:  · Antiviral drugs.  · Anti-inflammatory drugs.  · Pain medicines.    If the area involved is on your face, you may be referred to a specialist, such as an eye doctor (ophthalmologist) or an ear, nose, and throat (ENT) doctor to help you avoid eye problems, chronic pain, or disability.  Follow these instructions at home:  Medicines  · Take medicines only as directed by your health care provider.  · Apply an anti-itch or numbing cream to the affected area as directed by your health care provider.  Blister and Rash Care  · Take a cool bath or apply cool compresses to the area of the rash or blisters as directed by your health care provider. This may help with pain and itching.  · Keep your rash covered with a loose bandage (dressing). Wear loose-fitting clothing to help ease the pain of material rubbing against the rash.  · Keep your rash and blisters clean with mild soap and cool water or as directed by your health care provider.  · Check your rash every day for signs of infection. These include redness, swelling, and pain that lasts or increases.  · Do not pick your blisters.  · Do not scratch your rash.  General instructions  · Rest as directed by your health care provider.  · Keep all follow-up visits as directed by your health care provider. This is important.  · Until your blisters scab over, your infection can cause chickenpox in people who have never had it or been vaccinated against it. To prevent this from happening, avoid contact with other people, especially:  ? Babies.  ? Pregnant women.  ? Children who have eczema.  ? Elderly people who have transplants.  ? People who have chronic illnesses, such as leukemia or AIDS.  Contact a health care provider if:  · Your pain is not relieved with prescribed medicines.  · Your pain does not get better after  the rash heals.  · Your rash looks infected. Signs of infection include redness, swelling, and pain that lasts or increases.  Get help right away if:  · The rash is on your face or nose.  · You have facial pain, pain around your eye area, or loss of feeling on one side of your face.  · You have ear pain or you have ringing in your ear.  · You have loss of taste.  · Your condition gets worse.  This information is not intended to replace advice given to you by your health care provider. Make sure you discuss any questions you have with your health care provider.  Document Released: 12/18/2006 Document Revised: 08/13/2017 Document Reviewed: 10/29/2015  [employer] Interactive Interactive Patient Education © 2018 [employer]

## 2018-09-13 NOTE — PROGRESS NOTES
Subjective   Irma Esposito is a 48 y.o. female.     She reports having shingles before and this feels very similar in nature.       Rash   This is a new problem. The current episode started yesterday. The problem has been gradually worsening since onset. Location: left breast and posterior shoulder. The rash is characterized by burning and redness. She was exposed to nothing. Associated symptoms include rhinorrhea. Pertinent negatives include no cough, fever, shortness of breath or sore throat. (Hot flashes, normal ) Past treatments include nothing.        The following portions of the patient's history were reviewed and updated as appropriate: allergies, current medications, past family history, past medical history, past social history, past surgical history and problem list.    Review of Systems   Constitutional: Negative for chills and fever.   HENT: Positive for rhinorrhea. Negative for sore throat.    Respiratory: Negative for cough, chest tightness and shortness of breath.    Cardiovascular: Negative for chest pain.   Endocrine:        Hot flashes    Skin: Positive for rash (left axillary and left shoulder ).   Allergic/Immunologic: Positive for environmental allergies.       Objective   Physical Exam   Constitutional: She is oriented to person, place, and time. She appears well-developed and well-nourished.   HENT:   Head: Normocephalic.   Nose: Nose normal.   Cardiovascular: Regular rhythm and normal heart sounds.  Exam reveals no S3 and no S4.    No murmur heard.  Pulmonary/Chest: Effort normal and breath sounds normal. She has no decreased breath sounds. She has no wheezes. She has no rhonchi. She has no rales.   Musculoskeletal: She exhibits no edema.   Neurological: She is alert and oriented to person, place, and time. Gait normal.   Skin: Skin is warm and dry. Rash noted. Rash is maculopapular and vesicular.   Dermatomal rash noted to left chest, axillary and posterior shoulder    Psychiatric: She  has a normal mood and affect.       Assessment/Plan   Irma was seen today for herpes zoster.    Diagnoses and all orders for this visit:    Herpes zoster without complication  -     valACYclovir (VALTREX) 1000 MG tablet; Take 1 tablet by mouth 3 (Three) Times a Day for 7 days.    She was advised to avoid immunocompromised individuals.   She was given information regarding shingrix.

## 2018-09-17 ENCOUNTER — HOSPITAL ENCOUNTER (OUTPATIENT)
Facility: HOSPITAL | Age: 48
Setting detail: HOSPITAL OUTPATIENT SURGERY
Discharge: HOME | End: 2018-09-17
Attending: SURGERY | Admitting: SURGERY

## 2018-09-17 ENCOUNTER — ANESTHESIA (OUTPATIENT)
Dept: GASTROENTEROLOGY | Facility: HOSPITAL | Age: 48
End: 2018-09-17

## 2018-09-17 ENCOUNTER — ANESTHESIA EVENT (OUTPATIENT)
Dept: GASTROENTEROLOGY | Facility: HOSPITAL | Age: 48
End: 2018-09-17

## 2018-09-17 VITALS
SYSTOLIC BLOOD PRESSURE: 111 MMHG | HEART RATE: 66 BPM | BODY MASS INDEX: 24.91 KG/M2 | TEMPERATURE: 97.7 F | OXYGEN SATURATION: 100 % | DIASTOLIC BLOOD PRESSURE: 72 MMHG | WEIGHT: 135.38 LBS | HEIGHT: 62 IN | RESPIRATION RATE: 16 BRPM

## 2018-09-17 LAB
B-HCG UR QL: NEGATIVE
INTERNAL NEGATIVE CONTROL: NEGATIVE
INTERNAL POSITIVE CONTROL: POSITIVE
Lab: NORMAL

## 2018-09-17 PROCEDURE — 25010000002 GLUCAGON (RDNA) PER 1 MG: Performed by: SURGERY

## 2018-09-17 PROCEDURE — 81025 URINE PREGNANCY TEST: CPT | Performed by: SURGERY

## 2018-09-17 PROCEDURE — 25010000002 PROPOFOL 10 MG/ML EMULSION: Performed by: ANESTHESIOLOGY

## 2018-09-17 PROCEDURE — 45378 DIAGNOSTIC COLONOSCOPY: CPT | Performed by: SURGERY

## 2018-09-17 RX ORDER — PROPOFOL 10 MG/ML
VIAL (ML) INTRAVENOUS CONTINUOUS PRN
Status: DISCONTINUED | OUTPATIENT
Start: 2018-09-17 | End: 2018-09-17 | Stop reason: SURG

## 2018-09-17 RX ORDER — SODIUM CHLORIDE, SODIUM LACTATE, POTASSIUM CHLORIDE, CALCIUM CHLORIDE 600; 310; 30; 20 MG/100ML; MG/100ML; MG/100ML; MG/100ML
30 INJECTION, SOLUTION INTRAVENOUS CONTINUOUS PRN
Status: DISCONTINUED | OUTPATIENT
Start: 2018-09-17 | End: 2018-09-17 | Stop reason: HOSPADM

## 2018-09-17 RX ORDER — SODIUM CHLORIDE 9 MG/ML
30 INJECTION, SOLUTION INTRAVENOUS CONTINUOUS PRN
Status: DISCONTINUED | OUTPATIENT
Start: 2018-09-17 | End: 2018-09-17 | Stop reason: HOSPADM

## 2018-09-17 RX ORDER — PROPOFOL 10 MG/ML
VIAL (ML) INTRAVENOUS AS NEEDED
Status: DISCONTINUED | OUTPATIENT
Start: 2018-09-17 | End: 2018-09-17 | Stop reason: SURG

## 2018-09-17 RX ORDER — LIDOCAINE HYDROCHLORIDE 20 MG/ML
INJECTION, SOLUTION INFILTRATION; PERINEURAL AS NEEDED
Status: DISCONTINUED | OUTPATIENT
Start: 2018-09-17 | End: 2018-09-17 | Stop reason: SURG

## 2018-09-17 RX ADMIN — SODIUM CHLORIDE, POTASSIUM CHLORIDE, SODIUM LACTATE AND CALCIUM CHLORIDE: 600; 310; 30; 20 INJECTION, SOLUTION INTRAVENOUS at 07:25

## 2018-09-17 RX ADMIN — PROPOFOL 100 MCG/KG/MIN: 10 INJECTION, EMULSION INTRAVENOUS at 07:25

## 2018-09-17 RX ADMIN — LIDOCAINE HYDROCHLORIDE 100 MG: 20 INJECTION, SOLUTION INFILTRATION; PERINEURAL at 07:26

## 2018-09-17 RX ADMIN — SODIUM CHLORIDE 30 ML/HR: 9 INJECTION, SOLUTION INTRAVENOUS at 07:16

## 2018-09-17 RX ADMIN — PROPOFOL 100 MG: 10 INJECTION, EMULSION INTRAVENOUS at 07:25

## 2018-09-17 NOTE — H&P
Cc: Endoscopy Visit    HPI: 48 y.o. female here for screening cscope with family history of colon cancer (great uncle) and a prior polyp at the hepatic flexure with pathology showing lymphoid aggregate.    Past Medical History:   Diagnosis Date   • Hypernatremia    • S/P colonoscopy with polypectomy        Past Surgical History:   Procedure Laterality Date   •  SECTION  2002   •  SECTION  09/10/2004   • COLONOSCOPY  2015    Dr. Turcios   • WISDOM TOOTH EXTRACTION         is allergic to penicillins and other.       Medication List      ASK your doctor about these medications    fexofenadine 180 MG tablet  Commonly known as:  ALLEGRA     fluticasone 50 MCG/ACT nasal spray  Commonly known as:  FLONASE     GLUCOSAMINE CHONDR COMPLEX PO     Melatonin 10 MG tablet     MULTIVITAMIN ADULT PO     RESVERATROL PO     Turmeric 500 MG capsule     valACYclovir 1000 MG tablet  Commonly known as:  VALTREX  Take 1 tablet by mouth 3 (Three) Times a Day for 7 days.          Family History   Problem Relation Age of Onset   • Ovarian cancer Mother    • Colon cancer Mother    • Heart attack Father 59   • Parkinsonism Maternal Grandmother    • Colon cancer Paternal Grandfather    • Dementia Paternal Grandmother    • No Known Problems Sister    • No Known Problems Brother    • No Known Problems Daughter    • No Known Problems Son    • No Known Problems Maternal Aunt    • No Known Problems Paternal Aunt    • Breast cancer Other    • Colon cancer Other    • BRCA 1/2 Neg Hx    • Endometrial cancer Neg Hx        Social History     Social History   • Marital status:      Spouse name: N/A   • Number of children: N/A   • Years of education: N/A     Occupational History   • Not on file.     Social History Main Topics   • Smoking status: Former Smoker     Years: 3.00     Types: Cigarettes     Quit date:    • Smokeless tobacco: Never Used   • Alcohol use Yes      Comment: Moderate   • Drug use: No   • Sexual  activity: Not Currently     Other Topics Concern   • Not on file     Social History Narrative   • No narrative on file       Vitals:    09/17/18 0650   BP: 111/64   Pulse: 65   Resp: 16   Temp: 97.6 °F (36.4 °C)   SpO2: 100%       Body mass index is 24.76 kg/m².    Physical Exam    General: No acute distress  Lungs: No labored breathing, Pulse oximetry on room air is 100%.  Heart: RRR  Abdo: Soft  Mental:  Awake, alert, and oriented    Imp:     · Screening  · History of polyp, lymphoid aggregate  · Family history of colon cancer     Plan:  · c scope    Nallely Turcios MD  7:25 AM

## 2018-09-17 NOTE — OP NOTE
Colonoscopy Procedure Note  Irma Esposito  1970  Date of Procedure: 09/17/18    Pre-operative Diagnosis:    · Screening  · History of polyp, lymphoid aggregate  · Family history of colon cancer    Post-operative Diagnosis:  · normal    Procedure: Colonoscopy with terminal ileoscopy     Findings/Treatments:   · normal       Recommendations:   · C scope in 5 years  · Keep a copy of the photographs of the procedure given to you today for possible need for reference in the future.    Surgeon: Tam    Anesthetic: MAC per Roberto Horton MD    Indications:  As above    Scope Withdrawal Time:  6 minutes  3 seconds    Procedure Details     MAC anesthesia was induced.  The 180 Colonoscopy was inserted blindly into the rectum and advanced to the cecum, with relative ease,  without need for pressure, lift, or turning.  Cecum was identified by ileocecal valve and appendiceal orifice and photographed for documentation.  Terminal ileum was intubated and was normal.    Prep quality was excellent.  A careful inspection was made as the colonoscope was withdrawn, including a retroflexed view of the rectum; there was no suggestion of presence of angiodysplasias, colitis, polyps or diverticula, with no interventions.     Retroflexion in the rectum revealed no abnormalities.    Nallely Turcios MD  09/17/18  7:42 AM

## 2018-09-17 NOTE — ANESTHESIA POSTPROCEDURE EVALUATION
"Patient: Irma Esposito    Procedure Summary     Date:  09/17/18 Room / Location:  [facility] ENDOSCOPY 4 /  [facility]    Anesthesia Start:  0725 Anesthesia Stop:  0747    Procedure:  COLONOSCOPY TO CECUM AND TERMINAL ILEUM (N/A ) Diagnosis:       Family history of colon cancer      Family history of ovarian cancer      Colon cancer screening      (Family history of colon cancer [Z80.0])      (Family history of ovarian cancer [Z80.41])      (Colon cancer screening [Z12.11])    Surgeon:  Nallely Turcios MD Provider:  Roberto Horton MD    Anesthesia Type:  MAC ASA Status:  2          Anesthesia Type: MAC  Last vitals  BP   111/72 (09/17/18 0805)   Temp   36.5 °C (97.7 °F) (09/17/18 0753)   Pulse   66 (09/17/18 0805)   Resp   16 (09/17/18 0805)     SpO2   100 % (09/17/18 0805)     Post Anesthesia Care and Evaluation    Patient location during evaluation: bedside  Patient participation: complete - patient participated  Level of consciousness: awake and alert  Pain management: adequate  Airway patency: patent  Anesthetic complications: No anesthetic complications  PONV Status: none  Cardiovascular status: acceptable  Respiratory status: acceptable  Hydration status: acceptable    Comments: /72 (BP Location: Left arm, Patient Position: Sitting)   Pulse 66   Temp 36.5 °C (97.7 °F) (Oral)   Resp 16   Ht 157.5 cm (62\")   Wt 61.4 kg (135 lb 6 oz)   LMP 04/17/2018   SpO2 100%   BMI 24.76 kg/m²         "

## 2018-09-17 NOTE — ANESTHESIA PREPROCEDURE EVALUATION
Anesthesia Evaluation     Patient summary reviewed and Nursing notes reviewed   NPO Solid Status: > 8 hours  NPO Liquid Status: > 4 hours           Airway   Mallampati: II  No difficulty expected  Dental - normal exam     Pulmonary     breath sounds clear to auscultation  (+) a smoker Former,   Cardiovascular     Rhythm: regular        Neuro/Psych  GI/Hepatic/Renal/Endo      Musculoskeletal     Abdominal    Substance History      OB/GYN          Other                        Anesthesia Plan    ASA 2     MAC     intravenous induction   Anesthetic plan, all risks, benefits, and alternatives have been provided, discussed and informed consent has been obtained with: patient.

## 2019-06-25 ENCOUNTER — OFFICE VISIT (OUTPATIENT)
Dept: INTERNAL MEDICINE | Facility: CLINIC | Age: 49
End: 2019-06-25

## 2019-06-25 VITALS
DIASTOLIC BLOOD PRESSURE: 66 MMHG | HEIGHT: 62 IN | SYSTOLIC BLOOD PRESSURE: 104 MMHG | BODY MASS INDEX: 25.03 KG/M2 | WEIGHT: 136 LBS

## 2019-06-25 DIAGNOSIS — Z23 NEED FOR SHINGLES VACCINE: ICD-10-CM

## 2019-06-25 DIAGNOSIS — Z00.00 ANNUAL PHYSICAL EXAM: Primary | ICD-10-CM

## 2019-06-25 LAB
ALBUMIN SERPL-MCNC: 4.3 G/DL (ref 3.5–5.2)
ALBUMIN/GLOB SERPL: 1.5 G/DL
ALP SERPL-CCNC: 76 U/L (ref 39–117)
ALT SERPL W P-5'-P-CCNC: 15 U/L (ref 1–33)
ANION GAP SERPL CALCULATED.3IONS-SCNC: 11.3 MMOL/L
AST SERPL-CCNC: 19 U/L (ref 1–32)
BACTERIA UR QL AUTO: ABNORMAL /HPF
BASOPHILS # BLD AUTO: 0.02 10*3/MM3 (ref 0–0.2)
BASOPHILS NFR BLD AUTO: 0.3 % (ref 0–1.5)
BILIRUB SERPL-MCNC: 0.4 MG/DL (ref 0.2–1.2)
BILIRUB UR QL STRIP: NEGATIVE
BUN BLD-MCNC: 14 MG/DL (ref 6–20)
BUN/CREAT SERPL: 20.3 (ref 7–25)
CALCIUM SPEC-SCNC: 9.6 MG/DL (ref 8.6–10.5)
CHLORIDE SERPL-SCNC: 104 MMOL/L (ref 98–107)
CHOLEST SERPL-MCNC: 199 MG/DL (ref 0–200)
CLARITY UR: CLEAR
CO2 SERPL-SCNC: 28.7 MMOL/L (ref 22–29)
COLOR UR: ABNORMAL
CREAT BLD-MCNC: 0.69 MG/DL (ref 0.57–1)
DEPRECATED RDW RBC AUTO: 43.1 FL (ref 37–54)
EOSINOPHIL # BLD AUTO: 0.05 10*3/MM3 (ref 0–0.4)
EOSINOPHIL NFR BLD AUTO: 0.7 % (ref 0.3–6.2)
ERYTHROCYTE [DISTWIDTH] IN BLOOD BY AUTOMATED COUNT: 12.3 % (ref 12.3–15.4)
GFR SERPL CREATININE-BSD FRML MDRD: 90 ML/MIN/1.73
GLOBULIN UR ELPH-MCNC: 2.9 GM/DL
GLUCOSE BLD-MCNC: 112 MG/DL (ref 65–99)
GLUCOSE UR STRIP-MCNC: NEGATIVE MG/DL
HCT VFR BLD AUTO: 40.8 % (ref 34–46.6)
HDLC SERPL-MCNC: 80 MG/DL (ref 40–60)
HGB BLD-MCNC: 13.4 G/DL (ref 12–15.9)
HGB UR QL STRIP.AUTO: ABNORMAL
HYALINE CASTS UR QL AUTO: ABNORMAL /LPF
KETONES UR QL STRIP: NEGATIVE
LDLC SERPL CALC-MCNC: 99 MG/DL (ref 0–100)
LDLC/HDLC SERPL: 1.24 {RATIO}
LEUKOCYTE ESTERASE UR QL STRIP.AUTO: NEGATIVE
LYMPHOCYTES # BLD AUTO: 1.67 10*3/MM3 (ref 0.7–3.1)
LYMPHOCYTES NFR BLD AUTO: 24.8 % (ref 19.6–45.3)
MCH RBC QN AUTO: 32.4 PG (ref 26.6–33)
MCHC RBC AUTO-ENTMCNC: 32.8 G/DL (ref 31.5–35.7)
MCV RBC AUTO: 98.6 FL (ref 79–97)
MONOCYTES # BLD AUTO: 0.47 10*3/MM3 (ref 0.1–0.9)
MONOCYTES NFR BLD AUTO: 7 % (ref 5–12)
NEUTROPHILS # BLD AUTO: 4.53 10*3/MM3 (ref 1.7–7)
NEUTROPHILS NFR BLD AUTO: 67.2 % (ref 42.7–76)
NITRITE UR QL STRIP: NEGATIVE
PH UR STRIP.AUTO: 7 [PH] (ref 5–8)
PLATELET # BLD AUTO: 226 10*3/MM3 (ref 140–450)
PMV BLD AUTO: 10.8 FL (ref 6–12)
POTASSIUM BLD-SCNC: 4.4 MMOL/L (ref 3.5–5.2)
PROT SERPL-MCNC: 7.2 G/DL (ref 6–8.5)
PROT UR QL STRIP: NEGATIVE
RBC # BLD AUTO: 4.14 10*6/MM3 (ref 3.77–5.28)
RBC # UR: ABNORMAL /HPF
REF LAB TEST METHOD: ABNORMAL
SODIUM BLD-SCNC: 144 MMOL/L (ref 136–145)
SP GR UR STRIP: 1.01 (ref 1–1.03)
SQUAMOUS #/AREA URNS HPF: ABNORMAL /HPF
TRIGL SERPL-MCNC: 99 MG/DL (ref 0–150)
UROBILINOGEN UR QL STRIP: ABNORMAL
VLDLC SERPL-MCNC: 19.8 MG/DL (ref 5–40)
WBC NRBC COR # BLD: 6.74 10*3/MM3 (ref 3.4–10.8)
WBC UR QL AUTO: ABNORMAL /HPF

## 2019-06-25 PROCEDURE — 90471 IMMUNIZATION ADMIN: CPT | Performed by: INTERNAL MEDICINE

## 2019-06-25 PROCEDURE — 99396 PREV VISIT EST AGE 40-64: CPT | Performed by: INTERNAL MEDICINE

## 2019-06-25 PROCEDURE — 80053 COMPREHEN METABOLIC PANEL: CPT | Performed by: INTERNAL MEDICINE

## 2019-06-25 PROCEDURE — 80061 LIPID PANEL: CPT | Performed by: INTERNAL MEDICINE

## 2019-06-25 PROCEDURE — 81001 URINALYSIS AUTO W/SCOPE: CPT | Performed by: INTERNAL MEDICINE

## 2019-06-25 PROCEDURE — 90750 HZV VACC RECOMBINANT IM: CPT | Performed by: INTERNAL MEDICINE

## 2019-06-25 PROCEDURE — 93000 ELECTROCARDIOGRAM COMPLETE: CPT | Performed by: INTERNAL MEDICINE

## 2019-06-25 PROCEDURE — 85025 COMPLETE CBC W/AUTO DIFF WBC: CPT | Performed by: INTERNAL MEDICINE

## 2019-06-25 NOTE — PATIENT INSTRUCTIONS
Tennis Elbow  Tennis elbow is puffiness (inflammation) of the outer tendons of your forearm close to your elbow. Your tendons attach your muscles to your bones. Tennis elbow can happen in any sport or job in which you use your elbow too much. It is caused by doing the same motion over and over. Tennis elbow can cause:  · Pain and tenderness in your forearm and the outer part of your elbow.  · A burning feeling. This runs from your elbow through your arm.  · Weak  in your hands.    Follow these instructions at home:  Activity  · Rest your elbow and wrist as told by your doctor. Try to avoid any activities that caused the problem until your doctor says that you can do them again.  · If a physical therapist teaches you exercises, do all of them as told.  · If you lift an object, lift it with your palm facing up. This is easier on your elbow.  Lifestyle  · If your tennis elbow is caused by sports, check your equipment and make sure that:  ? You are using it correctly.  ? It fits you well.  · If your tennis elbow is caused by work, take breaks often, if you are able. Talk with your manager about doing your work in a way that is safe for you.  ? If your tennis elbow is caused by computer use, talk with your manager about any changes that can be made to your work setup.  General instructions  · If told, apply ice to the painful area:  ? Put ice in a plastic bag.  ? Place a towel between your skin and the bag.  ? Leave the ice on for 20 minutes, 2-3 times per day.  · Take medicines only as told by your doctor.  · If you were given a brace, wear it as told by your doctor.  · Keep all follow-up visits as told by your doctor. This is important.  Contact a doctor if:  · Your pain does not get better with treatment.  · Your pain gets worse.  · You have weakness in your forearm, hand, or fingers.  · You cannot feel your forearm, hand, or fingers.  This information is not intended to replace advice given to you by your health  care provider. Make sure you discuss any questions you have with your health care provider.  Document Released: 06/07/2011 Document Revised: 08/17/2017 Document Reviewed: 12/14/2015  [employer] Interactive Patient Education © 2018 [employer]

## 2019-06-25 NOTE — PROGRESS NOTES
Chief Complaint   Patient presents with   • Annual Exam     physical       Subjective   Irma Esposito is a 48 y.o. female.     History of Present Illness     She is here for annual examination.    She generally feels healthy.  Her appetite is good.  She is following a generally healthy diet.  She sleeps well.  Energy is good.  She exercises regularly.  She is up-to-date on dental exams and eye exams.      The following portions of the patient's history were reviewed and updated as appropriate: allergies, current medications, past family history, past medical history, past social history, past surgical history and problem list.    Review of Systems   Constitutional: Negative for appetite change, chills, fatigue, fever, unexpected weight gain and unexpected weight loss.   HENT: Negative for congestion, nosebleeds, sinus pressure, sore throat, tinnitus, trouble swallowing and voice change.    Eyes: Negative for blurred vision and double vision.        Has cataracts   Respiratory: Negative for cough and shortness of breath.    Cardiovascular: Negative for chest pain, palpitations and leg swelling.   Gastrointestinal: Negative for abdominal pain, constipation, diarrhea, nausea, vomiting and indigestion.   Endocrine: Negative for cold intolerance, heat intolerance, polydipsia and polyuria.   Genitourinary: Negative for breast discharge, dysuria, frequency and breast lump.   Musculoskeletal: Positive for arthralgias (right elbow). Negative for back pain.   Skin: Negative for rash.   Neurological: Negative for dizziness and headache.   Hematological: Negative for adenopathy. Bruises/bleeds easily (bruises easily).   Psychiatric/Behavioral: Negative for sleep disturbance and depressed mood. The patient is not nervous/anxious.          Current Outpatient Medications:   •  fexofenadine (ALLEGRA) 180 MG tablet, Take 180 mg by mouth daily., Disp: , Rfl:   •  fluticasone (FLONASE) 50 MCG/ACT nasal spray, 2 sprays into each  "nostril Daily., Disp: , Rfl:   •  Glucosamine-Chondroitin (GLUCOSAMINE CHONDR COMPLEX PO), Take 1 tablet by mouth Daily., Disp: , Rfl:   •  Melatonin 10 MG tablet, Take 1 tablet by mouth At Night As Needed. Insomnia , Disp: , Rfl:   •  Multiple Vitamins-Minerals (MULTIVITAMIN ADULT PO), Take 1 tablet by mouth Daily., Disp: , Rfl:   •  RESVERATROL PO, Take  by mouth., Disp: , Rfl:   •  Turmeric 500 MG capsule, Take 500 mg by mouth Daily., Disp: , Rfl:         Objective     /66   Ht 157.5 cm (62\")   Wt 61.7 kg (136 lb)   BMI 24.87 kg/m²     Physical Exam   Constitutional: She is oriented to person, place, and time. She appears well-developed and well-nourished. No distress.   HENT:   Right Ear: Tympanic membrane and ear canal normal.   Left Ear: Tympanic membrane and ear canal normal.   Nose: Right sinus exhibits no maxillary sinus tenderness and no frontal sinus tenderness. Left sinus exhibits no maxillary sinus tenderness and no frontal sinus tenderness.   Mouth/Throat: Oropharynx is clear and moist.   Eyes: Conjunctivae and EOM are normal. Pupils are equal, round, and reactive to light. No scleral icterus.   Neck: Normal range of motion. Neck supple. Normal carotid pulses present. Carotid bruit is not present. No tracheal deviation present. No thyromegaly present.   Cardiovascular: Regular rhythm, S1 normal, S2 normal and intact distal pulses. Exam reveals no gallop and no friction rub.   No murmur heard.  Pulses:       Carotid pulses are 2+ on the right side, and 2+ on the left side.  Pulmonary/Chest: Effort normal and breath sounds normal. She has no wheezes. She has no rhonchi. She has no rales. Chest wall is not dull to percussion.   Abdominal: Soft. Normal appearance and bowel sounds are normal. She exhibits no abdominal bruit. There is no hepatosplenomegaly. There is no tenderness. There is no rebound and no guarding.   Musculoskeletal: She exhibits no edema.   Some mild discomfort about right " lateral epicondyle   Lymphadenopathy:     She has no cervical adenopathy.   Neurological: She is alert and oriented to person, place, and time. No cranial nerve deficit. Coordination normal.   Skin: Skin is warm and dry.   Psychiatric: She has a normal mood and affect.   Nursing note and vitals reviewed.      ECG 12 Lead  Date/Time: 6/25/2019 8:13 AM  Performed by: Ronda Escobar  Authorized by: Dana Miranda MD   Comparison: compared with previous ECG from 6/20/2017  Similar to previous ECG  Rhythm: sinus bradycardia  Rate: bradycardic  BPM: 55  Conduction: conduction normal  ST Segments: ST segments normal  T Waves: T waves normal  QRS axis: normal    Clinical impression: normal ECG  Comments: Normal except for rate                Assessment/Plan   Irma was seen today for annual exam.    Diagnoses and all orders for this visit:    Annual physical exam  -     ECG 12 Lead  -     Comprehensive Metabolic Panel; Future  -     CBC & Differential; Future  -     Urinalysis With Microscopic If Indicated (No Culture) - Urine, Clean Catch; Future  -     Lipid Panel; Future  -     TSH Rfx On Abnormal To Free T4; Future    Need for shingles vaccine  -     Shingrix Vaccine      She will continue prudent diet, regular exercise.  She will continue to see her gynecologist for Pap smears and mammograms.  Shingrix No. 1 administered today.  Advised her she would need to get the second vaccine in 2 to 6 months.  She will call for an appointment.  She likely has tennis elbow.  Discussed.  Recommended conservative treatment.  If symptoms persist, physical therapy may help.

## 2019-06-26 LAB — TSH SERPL-ACNC: 1 MIU/ML (ref 0.27–4.2)

## 2019-09-03 ENCOUNTER — OFFICE VISIT (OUTPATIENT)
Dept: OBSTETRICS AND GYNECOLOGY | Age: 49
End: 2019-09-03

## 2019-09-03 ENCOUNTER — PROCEDURE VISIT (OUTPATIENT)
Dept: OBSTETRICS AND GYNECOLOGY | Age: 49
End: 2019-09-03

## 2019-09-03 VITALS
BODY MASS INDEX: 24.69 KG/M2 | SYSTOLIC BLOOD PRESSURE: 102 MMHG | WEIGHT: 134.2 LBS | HEIGHT: 62 IN | DIASTOLIC BLOOD PRESSURE: 60 MMHG

## 2019-09-03 DIAGNOSIS — Z80.41 FAMILY HISTORY OF OVARIAN CANCER: ICD-10-CM

## 2019-09-03 DIAGNOSIS — N83.209 CYST OF OVARY, UNSPECIFIED LATERALITY: Primary | ICD-10-CM

## 2019-09-03 DIAGNOSIS — Z01.419 ENCOUNTER FOR GYNECOLOGICAL EXAMINATION: Primary | ICD-10-CM

## 2019-09-03 DIAGNOSIS — Z12.39 SCREENING FOR BREAST CANCER: ICD-10-CM

## 2019-09-03 DIAGNOSIS — N95.0 PMB (POSTMENOPAUSAL BLEEDING): ICD-10-CM

## 2019-09-03 PROCEDURE — 99396 PREV VISIT EST AGE 40-64: CPT | Performed by: OBSTETRICS & GYNECOLOGY

## 2019-09-03 PROCEDURE — 76830 TRANSVAGINAL US NON-OB: CPT | Performed by: OBSTETRICS & GYNECOLOGY

## 2019-09-03 PROCEDURE — 58100 BIOPSY OF UTERUS LINING: CPT | Performed by: OBSTETRICS & GYNECOLOGY

## 2019-09-03 NOTE — PROGRESS NOTES
"Subjective     Chief Complaint   Patient presents with   • Gynecologic Exam     Last pap 17-, HPV-, MG 18       History of Present Illness    Irma Esposito is a 49 y.o.  who presents for annual exam.  She notes she had 2 days of bleeding in July, like a light menses. She had not had any prior bleeding in over 1 year before  She denies any pelvic pain or other issues  Both boys are at [employer] and doing well     Obstetric History:  OB History      Para Term  AB Living    2 2 2     2    SAB TAB Ectopic Molar Multiple Live Births              2         Menstrual History:     No LMP recorded. Patient is perimenopausal.         Current contraception: post menopausal status  History of abnormal Pap smear: no  Received Gardasil immunization: no  Perform regular self breast exam: yes - reg  Family history of uterine or ovarian cancer: yes - mother-ovarian--pt had neg My Risk testing  Family History of colon cancer: yes - mother  Family history of breast cancer: great aunt    Mammogram: ordered.  Colonoscopy: up to date.  DEXA: not indicated.    Exercise: moderately active  Calcium/Vitamin D: adequate intake    The following portions of the patient's history were reviewed and updated as appropriate: allergies, current medications, past family history, past medical history, past social history, past surgical history and problem list.    Review of Systems    Review of Systems   Constitutional: Negative for fatigue.   Respiratory: Negative for shortness of breath.    Gastrointestinal: Negative for abdominal pain.   Genitourinary: Negative for dysuria. pos for bleeding in July, no prior flow in over 1 year  Neurological: Negative for headaches.   Psychiatric/Behavioral: Negative for dysphoric mood.         Objective   Physical Exam    /60   Ht 157.5 cm (62\")   Wt 60.9 kg (134 lb 3.2 oz)   Breastfeeding? No   BMI 24.55 kg/m²     General:   alert, appears stated age and cooperative   Neck: " thyroid normal to palpation   Heart: regular rate and rhythm   Lungs: clear to auscultation bilaterally   Abdomen: soft, non-tender, without masses or organomegaly   Breast: inspection negative, no nipple discharge or bleeding, no masses or nodularity palpable   Vulva: normal, Bartholin's, Urethra, Spry's normal   Vagina: normal mucosa, normal discharge   Cervix: no cervical motion tenderness and no lesions   Uterus: non-tender, normal shape and consistency   Adnexa: no mass, fullness, tenderness   Rectal: normal rectal, no masses       tv u/s: normal ovaries and uterus, no adnexal masses or free fluid    Assessment/Plan   Irma was seen today for gynecologic exam.    Diagnoses and all orders for this visit:    Encounter for gynecological examination    Family history of ovarian cancer    Screening for breast cancer  -     Mammo Screening Digital Tomosynthesis Bilateral With CAD; Future    PMB (postmenopausal bleeding)  -     Reference Histopathology        All questions answered.  Breast self exam technique reviewed and patient encouraged to perform self-exam monthly.  Discussed healthy lifestyle modifications.  Recommended 30 minutes of aerobic exercise five times per week.  Discussed calcium needs to prevent osteoporosis.  Pap deferred since negative pap and hpv 2017 and pt agrees    Normal ovaries on u/s today, done due to family hx in her mother; pt had negative My Risk testing    Recommended embx due to postmenopausal bleeding. U/S done today prior to visit,  endometrium 4.42 mm      .Endometrial Biopsy Procedure Note    Pre-operative Diagnosis: postmenopausal bleeding    Post-operative Diagnosis: same    Indications: postmenopausal bleeding    Procedure Details    Urine pregnancy test was not done. Pt is not sexually active. The risks (including infection, bleeding, pain, and uterine perforation) and benefits of the procedure were explained to the patient and verbal consent informed consent was obtained.         The patient was placed in the dorsal lithotomy position.  A speculum inserted in the vagina, and the cervix prepped with povidone iodine X 3.      A sharp tenaculum was applied to the anterior lip of the cervix for stabilization.  A Pipelle was used to sound the uterus to a depth of 4 cm and sample the endometrium using sterile technique. The cervical os was slightly stenotic so dilator gently advanced prior to Pipelle using sterile technique.  Sample was sent for pathologic examination. Pipelle would not advance beyond 4 cm. Discussed possible stenosis at internal os however uterus only measured 5.4 cm in total length with u/s today so may have been in cavity. Will await pathology results. Pt understands.    Condition:  Stable    Complications:  None  Patient tolerated the procedure well without complications.     Plan:    The patient was advised to call for any fever or for prolonged or severe pain or bleeding. Will call pt with results and plan f/u based on those.

## 2019-09-05 LAB
DX ICD CODE: NORMAL
PATH REPORT.FINAL DX SPEC: NORMAL
PATH REPORT.GROSS SPEC: NORMAL
PATH REPORT.MICROSCOPIC SPEC OTHER STN: NORMAL
PATH REPORT.RELEVANT HX SPEC: NORMAL
PATH REPORT.SITE OF ORIGIN SPEC: NORMAL
PATHOLOGIST NAME: NORMAL
PAYMENT PROCEDURE: NORMAL

## 2019-09-12 ENCOUNTER — APPOINTMENT (OUTPATIENT)
Dept: WOMENS IMAGING | Facility: HOSPITAL | Age: 49
End: 2019-09-12

## 2019-09-12 ENCOUNTER — TELEPHONE (OUTPATIENT)
Dept: OBSTETRICS AND GYNECOLOGY | Age: 49
End: 2019-09-12

## 2019-09-12 PROCEDURE — 77063 BREAST TOMOSYNTHESIS BI: CPT | Performed by: RADIOLOGY

## 2019-09-12 PROCEDURE — 77067 SCR MAMMO BI INCL CAD: CPT | Performed by: RADIOLOGY

## 2019-09-12 NOTE — TELEPHONE ENCOUNTER
Notes recorded by Lin Zuñiga MD on 9/12/2019 at 8:49 AM EDT  Called pt and no answer. Left message of call. Please let Irma know results were non-diagnostic. Options would be to try another biopsy in office with cervical block and u/s guidance or surgical hysteroscopy. Please let me know how she would like to proceed.  -----  Pt notified. She wishes to proceed with another biopsy with cervical block and u/s guidance.

## 2019-09-12 NOTE — TELEPHONE ENCOUNTER
Called pt to review endometrial biopsy. Advised message left with assistant regarding results and follow-up.

## 2019-09-26 ENCOUNTER — TELEPHONE (OUTPATIENT)
Dept: OBSTETRICS AND GYNECOLOGY | Age: 49
End: 2019-09-26

## 2019-09-26 RX ORDER — MISOPROSTOL 200 UG/1
200 TABLET ORAL ONCE
Qty: 1 TABLET | Refills: 0 | Status: SHIPPED | OUTPATIENT
Start: 2019-09-26 | End: 2019-09-26

## 2019-09-26 NOTE — TELEPHONE ENCOUNTER
Called pt and recommended cytotec tonight prior to attempted biopsy. Instructed on use. Pt advised can take orally or place vaginally. Sent rx to pharmacy.

## 2019-09-27 ENCOUNTER — PROCEDURE VISIT (OUTPATIENT)
Dept: OBSTETRICS AND GYNECOLOGY | Age: 49
End: 2019-09-27

## 2019-09-27 ENCOUNTER — OFFICE VISIT (OUTPATIENT)
Dept: OBSTETRICS AND GYNECOLOGY | Age: 49
End: 2019-09-27

## 2019-09-27 VITALS
WEIGHT: 133.2 LBS | SYSTOLIC BLOOD PRESSURE: 110 MMHG | BODY MASS INDEX: 24.51 KG/M2 | HEIGHT: 62 IN | DIASTOLIC BLOOD PRESSURE: 68 MMHG

## 2019-09-27 DIAGNOSIS — N95.0 PMB (POSTMENOPAUSAL BLEEDING): Primary | ICD-10-CM

## 2019-09-27 DIAGNOSIS — Z01.812 PRE-PROCEDURE LAB EXAM: ICD-10-CM

## 2019-09-27 PROCEDURE — 58100 BIOPSY OF UTERUS LINING: CPT | Performed by: OBSTETRICS & GYNECOLOGY

## 2019-09-27 PROCEDURE — 76830 TRANSVAGINAL US NON-OB: CPT | Performed by: OBSTETRICS & GYNECOLOGY

## 2019-09-27 PROCEDURE — 81025 URINE PREGNANCY TEST: CPT | Performed by: OBSTETRICS & GYNECOLOGY

## 2019-09-27 NOTE — PROGRESS NOTES
Endometrial Biopsy Procedure Note    Pre-operative Diagnosis: postmenopausal bleeding    Post-operative Diagnosis: same    Indications: postmenopausal bleeding    Procedure Details    Urine pregnancy test was done today and result was negative.  The risks (including infection, bleeding, pain, and uterine perforation) and benefits of the procedure were explained to the patient and verbal and written informed consent was obtained.       The patient was placed in the dorsal lithotomy position.  A speculum inserted in the vagina, and the cervix prepped with povidone iodine X 3.       A sharp tenaculum was applied to the anterior lip of the cervix for stabilization.  A sterile Pipelle was advanced through the cervix but would not advance beyond the internal os. Patient was uncomfortable and offered cervical block with lidocaine with epinephrine and pt desired. The cervix was then injected with 0.5 cc of 1% lidocaine with epinephrine without issue. The cervical dilator was easily advanced through the internal os into the endometrium.  The  Pipelle endometrial aspirator was used to sample the endometrium and sound the uterus to 6 cm.  Sample was sent for pathologic examination.    Condition:  Stable    Complications:  None  Patient tolerated the procedure well without complications.    Plan:    The patient was advised to call for any fever or for prolonged or severe pain or bleeding. U/S was performed following the procedure. The endometrium showed evidence of sampling and appeared normal measuring 3.9 mm.  Will call pt with results.

## 2019-10-02 LAB
DX ICD CODE: NORMAL
PATH REPORT.FINAL DX SPEC: NORMAL
PATH REPORT.GROSS SPEC: NORMAL
PATH REPORT.RELEVANT HX SPEC: NORMAL
PATH REPORT.SITE OF ORIGIN SPEC: NORMAL
PATHOLOGIST NAME: NORMAL
PAYMENT PROCEDURE: NORMAL

## 2019-10-03 ENCOUNTER — TELEPHONE (OUTPATIENT)
Dept: OBSTETRICS AND GYNECOLOGY | Age: 49
End: 2019-10-03

## 2019-10-03 NOTE — TELEPHONE ENCOUNTER
----- Message from Charisma Cooper MA sent at 10/3/2019  1:14 PM EDT -----      ----- Message -----  From: Lin Zuñiga MD  Sent: 10/2/2019   2:42 PM  To: Coco Knight MA    Called pt to review and no answer. Please let her know that biopsy is benign. Advise pt to call if she has any recurrent bleeding.

## 2020-04-14 ENCOUNTER — TELEPHONE (OUTPATIENT)
Dept: OBSTETRICS AND GYNECOLOGY | Age: 50
End: 2020-04-14

## 2020-04-14 NOTE — TELEPHONE ENCOUNTER
Lm in detail that u/s report is not in our system.She needs to call the facility that performed the u/s and request a copy to be sent to .

## 2020-04-14 NOTE — TELEPHONE ENCOUNTER
(Zara villalobos) Pt participates yearly in a ovarian cancer screening and the reports get sent to us. Pt was calling to see if we had received anything because she had it done in January. I advised I didn't see anything in the chart from this year but I would send a message. Please advise.

## 2020-06-01 ENCOUNTER — TELEPHONE (OUTPATIENT)
Dept: INTERNAL MEDICINE | Facility: CLINIC | Age: 50
End: 2020-06-01

## 2020-06-01 RX ORDER — ACYCLOVIR 50 MG/G
OINTMENT TOPICAL
Qty: 15 G | Refills: 1 | Status: SHIPPED | OUTPATIENT
Start: 2020-06-01 | End: 2021-10-28

## 2020-06-01 NOTE — TELEPHONE ENCOUNTER
PT CALLED REQUESTING A REFILL FOR ZOZIRAX 5 G CREAM.    [facility] CONFIRMED     PT CALL BACK   [phone]

## 2020-07-14 ENCOUNTER — OFFICE VISIT (OUTPATIENT)
Dept: INTERNAL MEDICINE | Facility: CLINIC | Age: 50
End: 2020-07-14

## 2020-07-14 VITALS
DIASTOLIC BLOOD PRESSURE: 62 MMHG | OXYGEN SATURATION: 97 % | BODY MASS INDEX: 23.92 KG/M2 | SYSTOLIC BLOOD PRESSURE: 106 MMHG | HEIGHT: 62 IN | WEIGHT: 130 LBS | HEART RATE: 68 BPM

## 2020-07-14 DIAGNOSIS — Z23 NEED FOR TD VACCINE: ICD-10-CM

## 2020-07-14 DIAGNOSIS — Z11.59 SCREENING FOR VIRAL DISEASE: ICD-10-CM

## 2020-07-14 DIAGNOSIS — Z00.00 ANNUAL PHYSICAL EXAM: Primary | ICD-10-CM

## 2020-07-14 PROCEDURE — 99396 PREV VISIT EST AGE 40-64: CPT | Performed by: INTERNAL MEDICINE

## 2020-07-14 PROCEDURE — 90714 TD VACC NO PRESV 7 YRS+ IM: CPT | Performed by: INTERNAL MEDICINE

## 2020-07-14 PROCEDURE — 90471 IMMUNIZATION ADMIN: CPT | Performed by: INTERNAL MEDICINE

## 2020-07-14 NOTE — PROGRESS NOTES
Chief Complaint   Patient presents with   • Annual Exam       Subjective   Irma Esposito is a 49 y.o. female.     History of Present Illness     She is here for annual examination.    She generally feels healthy.  Her appetite is good.  She tries to follow a balanced diet.  She sleeps fairly well, no change.  Her energy is good.  She exercises regularly.  She is up to date on dental and eye exams.  She sees her gynecologist for Pap smears and mammograms.      The following portions of the patient's history were reviewed and updated as appropriate: allergies, current medications, past family history, past medical history, past social history, past surgical history and problem list.    Review of Systems   Constitutional: Negative for appetite change, chills, fatigue, fever, unexpected weight gain and unexpected weight loss.   HENT: Negative for congestion, sinus pressure, sore throat and trouble swallowing.    Eyes: Negative for blurred vision and double vision.   Respiratory: Negative for cough and shortness of breath.    Cardiovascular: Negative for chest pain, palpitations and leg swelling.   Gastrointestinal: Negative for abdominal pain, constipation, diarrhea, nausea, vomiting and indigestion.   Endocrine: Negative for cold intolerance, heat intolerance, polydipsia and polyuria.   Genitourinary: Negative for frequency.   Musculoskeletal: Negative for arthralgias and back pain.   Skin: Negative for rash.   Neurological: Negative for dizziness.   Hematological: Negative for adenopathy. Bruises/bleeds easily (bruises easily, no change).   Psychiatric/Behavioral: Negative for sleep disturbance and depressed mood. The patient is not nervous/anxious.          Current Outpatient Medications:   •  acyclovir (Zovirax) 5 % ointment, Apply  topically to the appropriate area as directed Every 3 (Three) Hours., Disp: 15 g, Rfl: 1  •  fexofenadine (ALLEGRA) 180 MG tablet, Take 180 mg by mouth daily., Disp: , Rfl:   •   "fluticasone (FLONASE) 50 MCG/ACT nasal spray, 2 sprays into each nostril Daily., Disp: , Rfl:   •  Glucosamine-Chondroitin (GLUCOSAMINE CHONDR COMPLEX PO), Take 1 tablet by mouth Daily., Disp: , Rfl:   •  Melatonin 10 MG tablet, Take 1 tablet by mouth At Night As Needed. Insomnia , Disp: , Rfl:   •  Multiple Vitamins-Minerals (MULTIVITAMIN ADULT PO), Take 1 tablet by mouth Daily., Disp: , Rfl:   •  RESVERATROL PO, Take  by mouth., Disp: , Rfl:         Objective     /62   Pulse 68   Ht 157.5 cm (62\")   Wt 59 kg (130 lb)   SpO2 97%   BMI 23.78 kg/m²     Physical Exam   Constitutional: She is oriented to person, place, and time. She appears well-developed and well-nourished. No distress.   HENT:   Right Ear: Tympanic membrane and ear canal normal.   Left Ear: Tympanic membrane and ear canal normal.   Nose: Right sinus exhibits no maxillary sinus tenderness and no frontal sinus tenderness. Left sinus exhibits no maxillary sinus tenderness and no frontal sinus tenderness.   Mouth/Throat: Oropharynx is clear and moist.   Eyes: Pupils are equal, round, and reactive to light. Conjunctivae and EOM are normal. No scleral icterus.   Neck: Normal range of motion. Neck supple. Normal carotid pulses present. Carotid bruit is not present. No tracheal deviation present. No thyromegaly present.   Cardiovascular: Regular rhythm, S1 normal, S2 normal and intact distal pulses. Exam reveals no gallop and no friction rub.   No murmur heard.  Pulses:       Carotid pulses are 2+ on the right side, and 2+ on the left side.  Pulmonary/Chest: Effort normal and breath sounds normal. She has no wheezes. She has no rhonchi. She has no rales. Chest wall is not dull to percussion.   Abdominal: Soft. Normal appearance and bowel sounds are normal. She exhibits no abdominal bruit. There is no hepatosplenomegaly. There is no tenderness. There is no rebound and no guarding.   Musculoskeletal: She exhibits no edema.   Lymphadenopathy:     She " has no cervical adenopathy.   Neurological: She is alert and oriented to person, place, and time. No cranial nerve deficit. Coordination normal.   Skin: Skin is warm and dry.   Psychiatric: She has a normal mood and affect.   Nursing note and vitals reviewed.        Assessment/Plan   Irma was seen today for annual exam.    Diagnoses and all orders for this visit:    Annual physical exam  -     Comprehensive Metabolic Panel  -     CBC (No Diff)  -     Lipid Panel  -     TSH Rfx On Abnormal To Free T4  -     Urinalysis With Culture If Indicated - Urine, Clean Catch    Need for Td vaccine  -     Td Vaccine Greater Than or Equal To 6yo Preservative Free IM    Screening for viral disease  -     Hepatitis C Antibody      Encouraged prudent diet, regular exercise, maintenance of healthy weight, good sleep habits,  avoidance of tobacco products, excessive alcohol.  In regard to COVID pandemic, encouraged social distancing, wearing a mask while out, frequent handwashing.

## 2020-07-17 LAB
ALBUMIN SERPL-MCNC: 5 G/DL (ref 3.5–5.2)
ALBUMIN/GLOB SERPL: 3.1 G/DL
ALP SERPL-CCNC: 65 U/L (ref 39–117)
ALT SERPL-CCNC: 22 U/L (ref 1–33)
APPEARANCE UR: CLEAR
AST SERPL-CCNC: 26 U/L (ref 1–32)
BACTERIA #/AREA URNS HPF: NORMAL /HPF
BACTERIA UR CULT: ABNORMAL
BACTERIA UR CULT: ABNORMAL
BILIRUB SERPL-MCNC: 0.5 MG/DL (ref 0–1.2)
BILIRUB UR QL STRIP: NEGATIVE
BUN SERPL-MCNC: 10 MG/DL (ref 6–20)
BUN/CREAT SERPL: 12.2 (ref 7–25)
CALCIUM SERPL-MCNC: 9.3 MG/DL (ref 8.6–10.5)
CHLORIDE SERPL-SCNC: 97 MMOL/L (ref 98–107)
CHOLEST SERPL-MCNC: 257 MG/DL (ref 0–200)
CO2 SERPL-SCNC: 28.5 MMOL/L (ref 22–29)
COLOR UR: YELLOW
CREAT SERPL-MCNC: 0.82 MG/DL (ref 0.57–1)
EPI CELLS #/AREA URNS HPF: NORMAL /HPF (ref 0–10)
ERYTHROCYTE [DISTWIDTH] IN BLOOD BY AUTOMATED COUNT: 11.6 % (ref 12.3–15.4)
GLOBULIN SER CALC-MCNC: 1.6 GM/DL
GLUCOSE SERPL-MCNC: 103 MG/DL (ref 65–99)
GLUCOSE UR QL: NEGATIVE
HCT VFR BLD AUTO: 40.1 % (ref 34–46.6)
HCV AB S/CO SERPL IA: <0.1 S/CO RATIO (ref 0–0.9)
HDLC SERPL-MCNC: 107 MG/DL (ref 40–60)
HGB BLD-MCNC: 13.8 G/DL (ref 12–15.9)
HGB UR QL STRIP: NEGATIVE
KETONES UR QL STRIP: NEGATIVE
LDLC SERPL CALC-MCNC: 126 MG/DL (ref 0–100)
LEUKOCYTE ESTERASE UR QL STRIP: ABNORMAL
MCH RBC QN AUTO: 32.8 PG (ref 26.6–33)
MCHC RBC AUTO-ENTMCNC: 34.4 G/DL (ref 31.5–35.7)
MCV RBC AUTO: 95.2 FL (ref 79–97)
MICRO URNS: ABNORMAL
NITRITE UR QL STRIP: NEGATIVE
OTHER ANTIBIOTIC SUSC ISLT: ABNORMAL
PH UR STRIP: 7.5 [PH] (ref 5–7.5)
PLATELET # BLD AUTO: 228 10*3/MM3 (ref 140–450)
POTASSIUM SERPL-SCNC: 4.3 MMOL/L (ref 3.5–5.2)
PROT SERPL-MCNC: 6.6 G/DL (ref 6–8.5)
PROT UR QL STRIP: NEGATIVE
RBC # BLD AUTO: 4.21 10*6/MM3 (ref 3.77–5.28)
RBC #/AREA URNS HPF: NORMAL /HPF (ref 0–2)
SODIUM SERPL-SCNC: 137 MMOL/L (ref 136–145)
SP GR UR: 1.01 (ref 1–1.03)
TRIGL SERPL-MCNC: 120 MG/DL (ref 0–150)
TSH SERPL DL<=0.005 MIU/L-ACNC: 1.53 UIU/ML (ref 0.27–4.2)
URINALYSIS REFLEX: ABNORMAL
UROBILINOGEN UR STRIP-MCNC: 0.2 MG/DL (ref 0.2–1)
VLDLC SERPL CALC-MCNC: 24 MG/DL
WBC # BLD AUTO: 4.41 10*3/MM3 (ref 3.4–10.8)
WBC #/AREA URNS HPF: NORMAL /HPF (ref 0–5)

## 2020-07-20 ENCOUNTER — TELEPHONE (OUTPATIENT)
Dept: INTERNAL MEDICINE | Facility: CLINIC | Age: 50
End: 2020-07-20

## 2020-07-20 RX ORDER — SULFAMETHOXAZOLE AND TRIMETHOPRIM 800; 160 MG/1; MG/1
1 TABLET ORAL 2 TIMES DAILY
Qty: 10 TABLET | Refills: 0 | Status: SHIPPED | OUTPATIENT
Start: 2020-07-20 | End: 2020-07-25

## 2020-07-20 NOTE — TELEPHONE ENCOUNTER
Caller: Irma Esposito    Relationship: Self    Best call back number: [phone]    What medication are you requesting: THE ANTIBIOTIC THEY SUGGESTED TO HER Friday 7-17-20 BUT SHE IS UNSURE OF THE NAME.     What are your current symptoms: BURNING AND URGENCY IN URINATION     How long have you been experiencing symptoms: 7-18-20       If a prescription is needed, what is your preferred pharmacy and phone number: [id number] - [place], [place] - [address] [place] [address] [phone] [facility] [phone]      Additional notes:PATIENT WAS CALLED Friday 7-17-20 AND WAS TOLD SHE HAD SOME BACTERIA IN HER URINE AND WAS ASKED IF SHE HAD ANY SYMPTOMS AT THE TIME SHE STATED SHE WAS NOT BUT AS OF Saturday MID DAY NOW SHE IS HAVING SYMPTOMS.

## 2020-09-29 ENCOUNTER — PROCEDURE VISIT (OUTPATIENT)
Dept: OBSTETRICS AND GYNECOLOGY | Age: 50
End: 2020-09-29

## 2020-09-29 ENCOUNTER — OFFICE VISIT (OUTPATIENT)
Dept: OBSTETRICS AND GYNECOLOGY | Age: 50
End: 2020-09-29

## 2020-09-29 ENCOUNTER — APPOINTMENT (OUTPATIENT)
Dept: WOMENS IMAGING | Facility: HOSPITAL | Age: 50
End: 2020-09-29

## 2020-09-29 VITALS
SYSTOLIC BLOOD PRESSURE: 98 MMHG | WEIGHT: 128.8 LBS | DIASTOLIC BLOOD PRESSURE: 56 MMHG | HEIGHT: 62 IN | BODY MASS INDEX: 23.7 KG/M2

## 2020-09-29 DIAGNOSIS — Z01.419 ENCOUNTER FOR GYNECOLOGICAL EXAMINATION: Primary | ICD-10-CM

## 2020-09-29 DIAGNOSIS — Z11.51 ENCOUNTER FOR SCREENING FOR HUMAN PAPILLOMAVIRUS (HPV): ICD-10-CM

## 2020-09-29 DIAGNOSIS — Z12.31 VISIT FOR SCREENING MAMMOGRAM: Primary | ICD-10-CM

## 2020-09-29 PROCEDURE — 77067 SCR MAMMO BI INCL CAD: CPT | Performed by: OBSTETRICS & GYNECOLOGY

## 2020-09-29 PROCEDURE — 99396 PREV VISIT EST AGE 40-64: CPT | Performed by: OBSTETRICS & GYNECOLOGY

## 2020-09-29 PROCEDURE — 77067 SCR MAMMO BI INCL CAD: CPT | Performed by: RADIOLOGY

## 2020-09-29 NOTE — PROGRESS NOTES
.  Subjective     Chief Complaint   Patient presents with   • Gynecologic Exam     AE  Last pap 17-, HPV-, MG 9/3/19       History of Present Illness    Irma Esposito is a 50 y.o.  who presents for annual exam.  She denies any vaginal bleeding or gyn issues  She is dating someone and has become sexually active. She denies any issues with this and declines STD screening.  Her son has recently had some neurologic issues. He has had a complete evaluation and no evidence of tumor/seizures. He is a senior at [employer] and they are considering college options still  She continues to have her u/s yearly at [employer] for ovarian cancer project. She notes they advised they will call /send letter now only if there is an abnormality.      Obstetric History:  OB History        2    Para   2    Term   2            AB        Living   2       SAB        TAB        Ectopic        Molar        Multiple        Live Births   2               Menstrual History:     No LMP recorded (lmp unknown). Patient is perimenopausal.         Current contraception: post menopausal status  History of abnormal Pap smear: no  Received Gardasil immunization: no  Perform regular self breast exam: yes - reg  Family history of uterine or ovarian cancer: yes - mother--pt had negative My Risk testing  Family History of colon cancer: yes - mother  Family history of breast cancer: yes - mat gr aunt    Mammogram: done today.  Colonoscopy: up to date, done , due again in 5 years  DEXA: not indicated.    Exercise: moderately active  Calcium/Vitamin D: adequate intake    The following portions of the patient's history were reviewed and updated as appropriate: allergies, current medications, past family history, past medical history, past social history, past surgical history and problem list.    Review of Systems    Review of Systems   Constitutional: Negative for fatigue.   Respiratory: Negative for shortness of breath.    Gastrointestinal:  "Negative for abdominal pain. negative for change in bowel habits   Genitourinary: Negative for dysuria. negative for vaginal bleeding  Neurological: Negative for headaches.   Psychiatric/Behavioral: Negative for dysphoric mood.         Objective   Physical Exam    BP 98/56   Ht 157.5 cm (62\")   Wt 58.4 kg (128 lb 12.8 oz)   LMP  (LMP Unknown)   Breastfeeding No   BMI 23.56 kg/m²   General:   alert and no distress   Heart: regular rate and rhythm   Lungs: clear to auscultation bilaterally   Breast: Inspection negative; no masses, retractions, nipple discharge or axillary adenopathy   Neck: Supple, no thyromegaly   Abdomen: Soft, nontender    No guarding or rebound   Pelvis: External genitalia: normal general appearance  Urinary system: urethral meatus normal  Vaginal: normal mucosa without prolapse or lesions  Cervix: normal appearance  Adnexa: no masses or tenderness  Uterus: normal, nontender  Rectal: no masses   Extremities: Extremities normal, atraumatic, no cyanosis or edema   Neurologic: Alert and oriented   Psychiatric: Normal affect, judgement, and mood     Assessment/Plan   Irma was seen today for gynecologic exam.    Diagnoses and all orders for this visit:    Encounter for gynecological examination  -     IGP, Apt HPV,rfx 16 / 18,45    Encounter for screening for human papillomavirus (HPV)  -     IGP, Apt HPV,rfx 16 / 18,45        All questions answered.  Breast self exam technique reviewed and patient encouraged to perform self-exam monthly.  Discussed healthy lifestyle modifications.  Recommended 30 minutes of aerobic exercise five times per week.  Discussed calcium/ Vit D needs to prevent osteoporosis.  Advised pt to call if she does not receive results of pap within 2 weeks           "

## 2020-10-02 LAB
CYTOLOGIST CVX/VAG CYTO: NORMAL
CYTOLOGY CVX/VAG DOC CYTO: NORMAL
CYTOLOGY CVX/VAG DOC THIN PREP: NORMAL
DX ICD CODE: NORMAL
HIV 1 & 2 AB SER-IMP: NORMAL
HPV I/H RISK 4 DNA CVX QL PROBE+SIG AMP: NEGATIVE
OTHER STN SPEC: NORMAL
STAT OF ADQ CVX/VAG CYTO-IMP: NORMAL

## 2020-10-05 ENCOUNTER — TELEPHONE (OUTPATIENT)
Dept: OBSTETRICS AND GYNECOLOGY | Age: 50
End: 2020-10-05

## 2020-10-05 NOTE — TELEPHONE ENCOUNTER
----- Message from Lin Zuñiga MD sent at 10/2/2020  4:46 PM EDT -----  Call Irma, her pap and hpv are negative/normal

## 2021-01-21 ENCOUNTER — IMMUNIZATION (OUTPATIENT)
Dept: VACCINE CLINIC | Facility: HOSPITAL | Age: 51
End: 2021-01-21

## 2021-01-21 PROCEDURE — 0001A: CPT | Performed by: THORACIC SURGERY (CARDIOTHORACIC VASCULAR SURGERY)

## 2021-01-21 PROCEDURE — 91300 HC SARSCOV02 VAC 30MCG/0.3ML IM: CPT | Performed by: THORACIC SURGERY (CARDIOTHORACIC VASCULAR SURGERY)

## 2021-05-18 ENCOUNTER — TELEPHONE (OUTPATIENT)
Dept: INTERNAL MEDICINE | Facility: CLINIC | Age: 51
End: 2021-05-18

## 2021-05-18 NOTE — TELEPHONE ENCOUNTER
Caller: Irma Esposito    Relationship to patient: Self    Best call back number: [phone]    Type of visit: PHYSICAL    Requested date: ANY DATE JUST WANTS 8 AM APPOINTMENT     If rescheduling, when is the original appointment: 7/26/21 AT 1 PM     Additional notes:PATIENT ORIGINAL APPOINTMENT WAS ON 7/16/21 AT 8AM AND IT WAS RESCHEDULED BECAUSE  WILL NOT BE IN OFFICE THAT DAY AND TIME RESCHEDULED TO DOES NOT WORK FOR PATIENT. UNABLE TO WARM TRANSFER TO GET DONE.

## 2021-10-18 NOTE — PROGRESS NOTES
.  Subjective     Chief Complaint   Patient presents with   • Gynecologic Exam     AE and Mg today, Last AE 2020 w/pap nml and HPV neg, Colonoscopy 2018, No problems       History of Present Illness    Irma Esposito is a 51 y.o.  who presents for annual exam.  Her son started at [employer] and is loving it; her younger is a loren and starting visits  She denies any gyn issues or vaginal bleeding. She still has yearly pelvic u/s at  for ovarian cancer project and has not been advised of any abnormal findings    Obstetric History:  OB History        2    Para   2    Term   2            AB        Living   2       SAB        IAB        Ectopic        Molar        Multiple        Live Births   2               Menstrual History:     No LMP recorded (lmp unknown). Patient is postmenopausal.         Current contraception: post menopausal status  History of abnormal Pap smear: no  Received Gardasil immunization: no  Perform regular self breast exam: yes - reg  Family history of uterine or ovarian cancer: yes - mother-ovarian; pt had negative My Risk testing  Family History of colon cancer: yes - mother  Family history of breast cancer: yes - mat Gr aunt    Mammogram: done today.  Colonoscopy: up to date, done  and due in 5 years  DEXA: not indicated.    Exercise: moderately active  Calcium/Vitamin D: adequate intake    The following portions of the patient's history were reviewed and updated as appropriate: allergies, current medications, past family history, past medical history, past social history, past surgical history and problem list.    Review of Systems    Review of Systems   Constitutional: Negative for fatigue.   Respiratory: Negative for shortness of breath.    Gastrointestinal: Negative for abdominal pain.   Genitourinary: Negative for vaginal bleeding or pelvic pain  Neurological: Negative for headaches.   Psychiatric/Behavioral: Negative for dysphoric mood.  "        Objective   Physical Exam    /58   Ht 157.5 cm (62\")   Wt 59.1 kg (130 lb 6.4 oz)   LMP  (LMP Unknown)   Breastfeeding No   BMI 23.85 kg/m²   General:   alert and no distress   Heart: regular rate and rhythm   Lungs: clear to auscultation bilaterally   Breast: Inspection negative; right breast is without masses, retractions, nipple discharge or axillary adenopathy; left breast with 1 cm round, smooth mass at 7:00 just lateral to nipple, no retractions, nipple discharge or axillary adenopathy noted.   Neck: Supple, no thyromegaly   Abdomen: soft, non-tender. No masses,  no organomegaly   Pelvis: External genitalia: normal general appearance  Urinary system: urethral meatus normal  Vaginal: normal mucosa without prolapse or lesions  Cervix: normal appearance  Adnexa: no masses or tenderness  Uterus: normal, nontender   Extremities: Extremities normal, atraumatic, no cyanosis or edema   Neurologic: Alert and oriented   Psychiatric: Normal affect, judgement, and mood     Assessment/Plan   Diagnoses and all orders for this visit:    1. Encounter for gynecological examination (Primary)    2. Encounter for medical examination to establish care  -     Ambulatory Referral to Internal Medicine    3. Left breast lump  -     Mammo Diagnostic Digital Tomosynthesis Left With CAD; Future  -     US breast left limited; Future  -     Ambulatory Referral to Breast Surgery        All questions answered.  Breast self exam technique reviewed and patient encouraged to perform self-exam monthly.  Discussed healthy lifestyle modifications.  Recommended 30 minutes of aerobic exercise five times per week.  Discussed calcium/Vit D needs to prevent osteoporosis.  Pap deferred as up to date and pt agrees    Pt able to feel breast lump after noted on exam. She believes this is new. Recommend diagnostic imaging and surgical consult unless imaging identifies benign source of finding and pt agrees. Advised her to call if she does " not receive results of imaging or contact to book recommended f/u. Plan visit here 6 weeks to re-examine and pt agrees.

## 2021-10-19 ENCOUNTER — APPOINTMENT (OUTPATIENT)
Dept: WOMENS IMAGING | Facility: HOSPITAL | Age: 51
End: 2021-10-19

## 2021-10-19 ENCOUNTER — OFFICE VISIT (OUTPATIENT)
Dept: OBSTETRICS AND GYNECOLOGY | Age: 51
End: 2021-10-19

## 2021-10-19 ENCOUNTER — PROCEDURE VISIT (OUTPATIENT)
Dept: OBSTETRICS AND GYNECOLOGY | Age: 51
End: 2021-10-19

## 2021-10-19 VITALS
BODY MASS INDEX: 24 KG/M2 | WEIGHT: 130.4 LBS | DIASTOLIC BLOOD PRESSURE: 58 MMHG | HEIGHT: 62 IN | SYSTOLIC BLOOD PRESSURE: 102 MMHG

## 2021-10-19 DIAGNOSIS — Z01.419 ENCOUNTER FOR GYNECOLOGICAL EXAMINATION: Primary | ICD-10-CM

## 2021-10-19 DIAGNOSIS — N63.20 LEFT BREAST LUMP: ICD-10-CM

## 2021-10-19 DIAGNOSIS — Z12.31 VISIT FOR SCREENING MAMMOGRAM: Primary | ICD-10-CM

## 2021-10-19 DIAGNOSIS — Z00.00 ENCOUNTER FOR MEDICAL EXAMINATION TO ESTABLISH CARE: ICD-10-CM

## 2021-10-19 PROCEDURE — 77067 SCR MAMMO BI INCL CAD: CPT | Performed by: RADIOLOGY

## 2021-10-19 PROCEDURE — 77067 SCR MAMMO BI INCL CAD: CPT | Performed by: OBSTETRICS & GYNECOLOGY

## 2021-10-19 PROCEDURE — 99396 PREV VISIT EST AGE 40-64: CPT | Performed by: OBSTETRICS & GYNECOLOGY

## 2021-10-19 PROCEDURE — 77063 BREAST TOMOSYNTHESIS BI: CPT | Performed by: RADIOLOGY

## 2021-10-19 PROCEDURE — 77063 BREAST TOMOSYNTHESIS BI: CPT | Performed by: OBSTETRICS & GYNECOLOGY

## 2021-10-19 PROCEDURE — 99213 OFFICE O/P EST LOW 20 MIN: CPT | Performed by: OBSTETRICS & GYNECOLOGY

## 2021-10-27 ENCOUNTER — TELEPHONE (OUTPATIENT)
Dept: SURGERY | Facility: CLINIC | Age: 51
End: 2021-10-27

## 2021-10-27 NOTE — TELEPHONE ENCOUNTER
I let patient know we have her referral for breast lump, chart is prepared, pending 11/18/21 MMG and Breast US at [facility].

## 2021-10-28 RX ORDER — ACYCLOVIR 50 MG/G
OINTMENT TOPICAL
Qty: 15 G | Refills: 1 | OUTPATIENT
Start: 2021-10-28 | End: 2022-07-22

## 2021-11-18 ENCOUNTER — APPOINTMENT (OUTPATIENT)
Dept: WOMENS IMAGING | Facility: HOSPITAL | Age: 51
End: 2021-11-18

## 2021-11-18 PROCEDURE — 77065 DX MAMMO INCL CAD UNI: CPT | Performed by: RADIOLOGY

## 2021-11-18 PROCEDURE — 76642 ULTRASOUND BREAST LIMITED: CPT | Performed by: RADIOLOGY

## 2021-11-18 PROCEDURE — 77061 BREAST TOMOSYNTHESIS UNI: CPT | Performed by: RADIOLOGY

## 2021-11-18 PROCEDURE — G0279 TOMOSYNTHESIS, MAMMO: HCPCS | Performed by: RADIOLOGY

## 2021-11-23 ENCOUNTER — TELEPHONE (OUTPATIENT)
Dept: SURGERY | Facility: CLINIC | Age: 51
End: 2021-11-23

## 2021-11-23 NOTE — TELEPHONE ENCOUNTER
New patient appointment with Dana Law NP is scheduled on 3/15/2022 @ 2:30pm.    Called and spoke to patient, patient expressed v/u of appointment time.    Sent patient a reminder letter in the mail.

## 2021-11-30 ENCOUNTER — OFFICE VISIT (OUTPATIENT)
Dept: OBSTETRICS AND GYNECOLOGY | Age: 51
End: 2021-11-30

## 2021-11-30 VITALS
BODY MASS INDEX: 23.92 KG/M2 | HEIGHT: 62 IN | DIASTOLIC BLOOD PRESSURE: 64 MMHG | WEIGHT: 130 LBS | SYSTOLIC BLOOD PRESSURE: 108 MMHG

## 2021-11-30 DIAGNOSIS — N63.0 BREAST LUMP: Primary | ICD-10-CM

## 2021-11-30 PROCEDURE — 99212 OFFICE O/P EST SF 10 MIN: CPT | Performed by: OBSTETRICS & GYNECOLOGY

## 2021-11-30 NOTE — PROGRESS NOTES
"Chief complaint:breast lump    HPI  Irma Esposito is a 51 y.o. female presents for f/u exam. She notes she was able to feel the breast lump initially after her appt but has not been able to feel it recently. She denies any breast pain or issues. She had diagnostic imaging at [facility] that showed benign findings.          The following portions of the patient's history were reviewed and updated as appropriate: allergies, current medications, past family history, past medical history, past social history, past surgical history and problem list.    Review of Systems  Pertinent items are noted in HPI.    /64   Ht 157.5 cm (62\")   Wt 59 kg (130 lb)   LMP  (LMP Unknown)   Breastfeeding No   BMI 23.78 kg/m²         Physical Exam  Constitutional:       Appearance: Normal appearance.   Pulmonary:      Effort: Pulmonary effort is normal.   Neurological:      General: No focal deficit present.      Mental Status: She is oriented to person, place, and time.   Psychiatric:         Mood and Affect: Mood normal.         Behavior: Behavior normal.     Breasts: inspection negative; examined breasts in sitting and supine positions. Right breast is without masses, retractions, nipple discharge or axillary adenopathy. There is an area of fibroglandular nodularity in the lower, inner quadrant. Left breast is without masses, retractions, nipple discharge or axillary adenopathy. The focal mass noted before is not appreciated. An area of fibroglandular nodularity is noted in the lower, inner quadrant that is similar to that noted in the right breast.         Diagnoses and all orders for this visit:    1. Breast lump (Primary)      Focal breast mass not noted today. Fibroglandular nodularity is appreciated in both breasts in the lower, inner quadrants. Imaging reviewed and no abnormalities noted. Patient is comfortable observing for now as she previously felt the mass as well but it is not as well defined today. She may keep appt " with breast NP next spring but may cancel if the mass does not recur. She will continue monthly SBE.

## 2021-12-13 ENCOUNTER — OFFICE VISIT (OUTPATIENT)
Dept: INTERNAL MEDICINE | Facility: CLINIC | Age: 51
End: 2021-12-13

## 2021-12-13 VITALS
TEMPERATURE: 97.7 F | DIASTOLIC BLOOD PRESSURE: 64 MMHG | HEART RATE: 67 BPM | SYSTOLIC BLOOD PRESSURE: 126 MMHG | BODY MASS INDEX: 24.29 KG/M2 | HEIGHT: 62 IN | OXYGEN SATURATION: 98 % | WEIGHT: 132 LBS

## 2021-12-13 DIAGNOSIS — Z00.00 ANNUAL PHYSICAL EXAM: Primary | ICD-10-CM

## 2021-12-13 LAB
BILIRUB UR QL STRIP: NEGATIVE
CLARITY UR: CLEAR
COLOR UR: YELLOW
GLUCOSE UR STRIP-MCNC: NEGATIVE MG/DL
HGB UR QL STRIP.AUTO: NEGATIVE
KETONES UR QL STRIP: NEGATIVE
LEUKOCYTE ESTERASE UR QL STRIP.AUTO: NEGATIVE
NITRITE UR QL STRIP: NEGATIVE
PH UR STRIP.AUTO: 7 [PH] (ref 5–8)
PROT UR QL STRIP: NEGATIVE
SP GR UR STRIP: 1.01 (ref 1–1.03)
UROBILINOGEN UR QL STRIP: NORMAL

## 2021-12-13 PROCEDURE — 93000 ELECTROCARDIOGRAM COMPLETE: CPT | Performed by: INTERNAL MEDICINE

## 2021-12-13 PROCEDURE — 99396 PREV VISIT EST AGE 40-64: CPT | Performed by: INTERNAL MEDICINE

## 2021-12-13 PROCEDURE — 81003 URINALYSIS AUTO W/O SCOPE: CPT | Performed by: INTERNAL MEDICINE

## 2021-12-13 NOTE — PROGRESS NOTES
Chief Complaint   Patient presents with   • Annual Exam       Subjective   Irma Esposito is a 51 y.o. female.     History of Present Illness     She is here for annual examination.    She generally feels healthy.    Her appetite is good.    She tries to follow a balanced diet.    She sleeps fairly well.   Her energy is good.    She exercises regularly.    She is up to date on dental and eye exams.                  The following portions of the patient's history were reviewed and updated as appropriate: allergies, current medications, past family history, past medical history, past social history, past surgical history and problem list.      Current Outpatient Medications:   •  acyclovir (ZOVIRAX) 5 % ointment, APPLY TOPICALLY TO THE APPROPRIATE AREA AS DIRECTED EVERY 3 (THREE) HOURS., Disp: 15 g, Rfl: 1  •  fexofenadine (ALLEGRA) 180 MG tablet, Take 180 mg by mouth daily., Disp: , Rfl:   •  Glucosamine-Chondroitin (GLUCOSAMINE CHONDR COMPLEX PO), Take 1 tablet by mouth Daily., Disp: , Rfl:   •  Misc Natural Products (TOTAL MEMORY & FOCUS FORMULA PO), Take  by mouth., Disp: , Rfl:   •  Multiple Vitamins-Minerals (MULTIVITAMIN ADULT PO), Take 1 tablet by mouth Daily., Disp: , Rfl:            Review of Systems   Constitutional: Negative for appetite change and fever.   HENT: Negative for ear pain, nosebleeds, sinus pressure, sore throat and trouble swallowing.    Eyes: Negative for blurred vision and double vision.        Will have cataracts removed next year   Respiratory: Negative for cough and shortness of breath.    Cardiovascular: Negative for chest pain, palpitations and leg swelling.   Gastrointestinal: Negative for abdominal pain, constipation, diarrhea, nausea and vomiting.   Endocrine: Negative for cold intolerance, heat intolerance, polydipsia and polyuria.   Genitourinary: Negative for breast discharge, breast lump and dysuria.   Musculoskeletal: Negative for arthralgias and back pain.   Skin: Negative  "for rash.   Neurological: Negative for headache.   Hematological: Negative for adenopathy. Bruises/bleeds easily (has always bruised easily).   Psychiatric/Behavioral: Negative for depressed mood. The patient is not nervous/anxious.            Objective     /64   Pulse 67   Temp 97.7 °F (36.5 °C)   Ht 157.5 cm (62\")   Wt 59.9 kg (132 lb)   LMP  (LMP Unknown)   SpO2 98%   BMI 24.14 kg/m²         Physical Exam  Vitals and nursing note reviewed.   Constitutional:       General: She is not in acute distress.     Appearance: Normal appearance. She is well-developed.   HENT:      Right Ear: Tympanic membrane and ear canal normal.      Left Ear: Tympanic membrane and ear canal normal.      Nose:      Right Sinus: No maxillary sinus tenderness or frontal sinus tenderness.      Left Sinus: No maxillary sinus tenderness or frontal sinus tenderness.   Eyes:      General: No scleral icterus.     Conjunctiva/sclera: Conjunctivae normal.      Pupils: Pupils are equal, round, and reactive to light.   Neck:      Thyroid: No thyromegaly.      Vascular: Normal carotid pulses. No carotid bruit.      Trachea: No tracheal deviation.   Cardiovascular:      Rate and Rhythm: Regular rhythm.      Pulses:           Carotid pulses are 2+ on the right side and 2+ on the left side.     Heart sounds: S1 normal and S2 normal. No murmur heard.  No friction rub. No gallop.    Pulmonary:      Effort: Pulmonary effort is normal.      Breath sounds: Normal breath sounds. No wheezing, rhonchi or rales.   Abdominal:      General: Bowel sounds are normal.      Palpations: Abdomen is soft.      Tenderness: There is no abdominal tenderness. There is no guarding or rebound.   Musculoskeletal:      Cervical back: Normal range of motion and neck supple.      Right lower leg: No edema.      Left lower leg: No edema.   Lymphadenopathy:      Cervical: No cervical adenopathy.   Skin:     General: Skin is warm and dry.   Neurological:      Mental " Status: She is alert and oriented to person, place, and time.      Cranial Nerves: No cranial nerve deficit.      Coordination: Coordination normal.      Deep Tendon Reflexes:      Reflex Scores:       Patellar reflexes are 2+ on the right side and 2+ on the left side.        ECG 12 Lead    Date/Time: 12/13/2021 8:19 AM  Performed by: Ronda Escobar MA  Authorized by: Dana Miranda MD   Comparison: compared with previous ECG from 6/25/2019  Similar to previous ECG  Rhythm: sinus rhythm  Rate: normal  BPM: 63  Conduction: conduction normal  ST Segments: ST segments normal  T Waves: T waves normal  QRS axis: normal    Clinical impression: normal ECG            Assessment/Plan   Diagnoses and all orders for this visit:    1. Annual physical exam (Primary)  -     ECG 12 Lead  -     Comprehensive Metabolic Panel  -     Lipid Panel With / Chol / HDL Ratio  -     CBC (No Diff)  -     Urinalysis With Microscopic If Indicated (No Culture) - Urine, Clean Catch  -     TSH Rfx On Abnormal To Free T4      Encouraged prudent diet, regular exercise, maintenance of healthy weight, good sleep habits,  avoidance of tobacco products, excessive alcohol.              Return in about 1 year (around 12/13/2022) for Annual physical.

## 2021-12-14 LAB
ALBUMIN SERPL-MCNC: 4.9 G/DL (ref 3.8–4.9)
ALBUMIN/GLOB SERPL: 2.2 {RATIO} (ref 1.2–2.2)
ALP SERPL-CCNC: 74 IU/L (ref 44–121)
ALT SERPL-CCNC: 24 IU/L (ref 0–32)
AST SERPL-CCNC: 26 IU/L (ref 0–40)
BILIRUB SERPL-MCNC: 0.5 MG/DL (ref 0–1.2)
BUN SERPL-MCNC: 13 MG/DL (ref 6–24)
BUN/CREAT SERPL: 18 (ref 9–23)
CALCIUM SERPL-MCNC: 9.4 MG/DL (ref 8.7–10.2)
CHLORIDE SERPL-SCNC: 101 MMOL/L (ref 96–106)
CHOLEST SERPL-MCNC: 260 MG/DL (ref 100–199)
CHOLEST/HDLC SERPL: 2.8 RATIO (ref 0–4.4)
CO2 SERPL-SCNC: 26 MMOL/L (ref 20–29)
CREAT SERPL-MCNC: 0.72 MG/DL (ref 0.57–1)
ERYTHROCYTE [DISTWIDTH] IN BLOOD BY AUTOMATED COUNT: 11.4 % (ref 11.7–15.4)
GLOBULIN SER CALC-MCNC: 2.2 G/DL (ref 1.5–4.5)
GLUCOSE SERPL-MCNC: 103 MG/DL (ref 65–99)
HCT VFR BLD AUTO: 43.2 % (ref 34–46.6)
HDLC SERPL-MCNC: 93 MG/DL
HGB BLD-MCNC: 14.2 G/DL (ref 11.1–15.9)
LDLC SERPL CALC-MCNC: 144 MG/DL (ref 0–99)
MCH RBC QN AUTO: 31.7 PG (ref 26.6–33)
MCHC RBC AUTO-ENTMCNC: 32.9 G/DL (ref 31.5–35.7)
MCV RBC AUTO: 96 FL (ref 79–97)
PLATELET # BLD AUTO: 210 X10E3/UL (ref 150–450)
POTASSIUM SERPL-SCNC: 4 MMOL/L (ref 3.5–5.2)
PROT SERPL-MCNC: 7.1 G/DL (ref 6–8.5)
RBC # BLD AUTO: 4.48 X10E6/UL (ref 3.77–5.28)
SODIUM SERPL-SCNC: 142 MMOL/L (ref 134–144)
TRIGL SERPL-MCNC: 134 MG/DL (ref 0–149)
TSH SERPL DL<=0.005 MIU/L-ACNC: 0.82 UIU/ML (ref 0.45–4.5)
UNABLE TO VOID: NORMAL
VLDLC SERPL CALC-MCNC: 23 MG/DL (ref 5–40)
WBC # BLD AUTO: 4.2 X10E3/UL (ref 3.4–10.8)

## 2022-01-25 ENCOUNTER — TELEPHONE (OUTPATIENT)
Dept: SURGERY | Facility: CLINIC | Age: 52
End: 2022-01-25

## 2022-01-25 NOTE — TELEPHONE ENCOUNTER
Called patient to see about moving appt up sooner but she said she would just do her appt in March.

## 2022-03-15 ENCOUNTER — OFFICE VISIT (OUTPATIENT)
Dept: SURGERY | Facility: CLINIC | Age: 52
End: 2022-03-15

## 2022-03-15 VITALS
DIASTOLIC BLOOD PRESSURE: 72 MMHG | WEIGHT: 132 LBS | SYSTOLIC BLOOD PRESSURE: 107 MMHG | HEIGHT: 62 IN | HEART RATE: 59 BPM | BODY MASS INDEX: 24.29 KG/M2

## 2022-03-15 DIAGNOSIS — Z80.41 FH: OVARIAN CANCER: ICD-10-CM

## 2022-03-15 DIAGNOSIS — N60.11 FIBROCYSTIC BREAST CHANGES, BILATERAL: Primary | ICD-10-CM

## 2022-03-15 DIAGNOSIS — R92.2 BREAST DENSITY: ICD-10-CM

## 2022-03-15 DIAGNOSIS — N60.12 FIBROCYSTIC BREAST CHANGES, BILATERAL: Primary | ICD-10-CM

## 2022-03-15 PROBLEM — R92.30 BREAST DENSITY: Status: ACTIVE | Noted: 2022-03-15

## 2022-03-15 PROCEDURE — 99203 OFFICE O/P NEW LOW 30 MIN: CPT | Performed by: NURSE PRACTITIONER

## 2022-03-15 RX ORDER — POLYMYXIN B SULFATE AND TRIMETHOPRIM 1; 10000 MG/ML; [USP'U]/ML
SOLUTION OPHTHALMIC
COMMUNITY
Start: 2022-02-19 | End: 2022-06-19 | Stop reason: SDDI

## 2022-03-15 RX ORDER — FLUOROMETHOLONE ACETATE 1 MG/ML
SUSPENSION/ DROPS OPHTHALMIC
COMMUNITY
Start: 2022-02-21 | End: 2022-06-19 | Stop reason: SDDI

## 2022-03-15 RX ORDER — ATROPINE SULFATE 10 MG/ML
SOLUTION/ DROPS OPHTHALMIC
COMMUNITY
Start: 2022-01-26 | End: 2022-06-19 | Stop reason: SDDI

## 2022-03-15 RX ORDER — BROMFENAC SODIUM 0.7 MG/ML
SOLUTION/ DROPS OPHTHALMIC
COMMUNITY
Start: 2022-02-18 | End: 2022-06-19 | Stop reason: SDDI

## 2022-03-15 RX ORDER — DOCUSATE SODIUM 100 MG/1
100 CAPSULE, LIQUID FILLED ORAL 2 TIMES DAILY
COMMUNITY

## 2022-03-15 NOTE — PROGRESS NOTES
[facility]     Referring Provider: Lin Zuñiga MD     Chief complaint: abnormal breast imaging     HPI: Ms. Irma Esposito is a 52 yo woman, seen at the request of Lin Zuñiga MD, for left breast mass.     I personally reviewed her records and summarized her relevant breast history/imaging:    She has no personal history of breast procedures.    She has a remote family history of breast cancer in her MGA at age 65, mother with ovarian cancer at age 37.  In 8/2017 she completed [employer] genetic testing with no mutation detected.     10/19/2021: Clinic visit with Lin Zuñiga MD  Breast: Inspection negative; right breast is without masses, retractions, nipple discharge or axillary adenopathy; left breast with 1 cm round, smooth mass at 7:00 just lateral to nipple, no retractions, nipple discharge or axillary adenopathy    Left breast lump  -     Mammo Diagnostic Digital Tomosynthesis Left With CAD; Future  -     US breast left limited; Future  -     Ambulatory Referral to Breast Surgery    11/30/2021: Clinic visit with Lin Zuñiga MD  presents for f/u exam. She notes she was able to feel the breast lump initially after her appt but has not been able to feel it recently. She denies any breast pain or issues. She had diagnostic imaging at [facility] that showed benign findings  Breasts: inspection negative; examined breasts in sitting and supine positions. Right breast is without masses, retractions, nipple discharge or axillary adenopathy. There is an area of fibroglandular nodularity in the lower, inner quadrant. Left breast is without masses, retractions, nipple discharge or axillary adenopathy. The focal mass noted before is not appreciated. An area of fibroglandular nodularity is noted in the lower, inner quadrant that is similar to that noted in the right breast.     Focal breast mass not noted today. Fibroglandular nodularity is appreciated in both breasts in the lower, inner quadrants.  Imaging reviewed and no abnormalities noted. Patient is comfortable observing for now as she previously felt the mass as well but it is not as well defined today. She may keep appt with breast NP next spring but may cancel if the mass does not recur. She will continue monthly SBE    9/12/2019: Bilateral screening mammogram with tomosynthesis at [facility]  Breasts are heterogeneously dense.  There are stable areas of asymmetric breast tissue seen in both breasts.  No suspicious masses, suspicious microcalcifications or architectural distortions are identified.  There is been no significant change from prior exam.  Impression:  Stable areas of asymmetric breast tissue in both breasts are benign negative.  Screening mammogram 1 year is recommended.  BI-RADS Category 2    9/29/2020: Bilateral screening mammogram at [facility]  There is scattered areas of fibroglandular density.  No suspicious masses, significant calcifications or other abnormalities are seen.  There is been no significant change from prior exams.  Impression:  There is no mammographic evidence of malignancy.  Screening mammogram 1 year is recommended.  BI-RADS Category 1    10/19/2021: Bilateral screening mammogram with tomosynthesis at [facility]  There are scattered areas of fibroglandular density.  No suspicious masses, significant calcifications or other abnormalities are seen.  There is been no significant change from prior exam.  Impression:  There is no mammographic evidence of malignancy.  Screening mammogram 1 year is recommended.  BI-RADS Category 1    11/18/2021: Left diagnostic mammogram with tomosynthesis, left breast limited ultrasound at [facility]  Current complaints symptoms:  Patient is seen for palpable lump or thickening in the left breast.  Patient has no personal history of cancer.  Patient has a family history of breast cancer: Great aunt age 55.    There are  scattered areas of fibroglandular density.  The patient indicates a palpable lump or thickening anterior left breast, at 7:00 areolar edge position.  There is no suspicious finding in region of clinical concern.  No masses, microcalcifications or architectural distortions are identified.  Left breast real time limited breast ultrasound:  There is no evidence of any solid mass or abnormal cystic elements.  Impression:  Area in the left breast is benign negative.  In view of negative findings, clinical follow-up recommended as needed.  Should lump persist or worsen, surgical consultation would be recommended.  Clinical follow-up recommended.  BI-RADS Category 2      Today she presents with no breast complaints.  She denies any breast lumps, pain, skin changes, or nipple discharge.  She denies any prior history of abnormal mammograms or breast biopsies.     She was by herself in clinic today.     Review of Systems   All other systems reviewed and are negative.      Medications:    Current Outpatient Medications:   •  acyclovir (ZOVIRAX) 5 % ointment, APPLY TOPICALLY TO THE APPROPRIATE AREA AS DIRECTED EVERY 3 (THREE) HOURS., Disp: 15 g, Rfl: 1  •  atropine 1 % ophthalmic solution, INSTILL 1 DROP INTO RIGHT EYE TWICE A DAY, Disp: , Rfl:   •  docusate sodium (COLACE) 100 MG capsule, Take 100 mg by mouth 2 (Two) Times a Day., Disp: , Rfl:   •  fexofenadine (ALLEGRA) 180 MG tablet, Take 180 mg by mouth daily., Disp: , Rfl:   •  Flarex 0.1 % ophthalmic suspension, PLEASE SEE ATTACHED FOR DETAILED DIRECTIONS, Disp: , Rfl:   •  Glucosamine-Chondroitin (GLUCOSAMINE CHONDR COMPLEX PO), Take 1 tablet by mouth Daily., Disp: , Rfl:   •  Misc Natural Products (TOTAL MEMORY & FOCUS FORMULA PO), Take  by mouth., Disp: , Rfl:   •  Multiple Vitamins-Minerals (MULTIVITAMIN ADULT PO), Take 1 tablet by mouth Daily., Disp: , Rfl:   •  Prolensa 0.07 % solution, , Disp: , Rfl:   •  trimethoprim-polymyxin b (POLYTRIM) 58217-4.1 UNIT/ML-%  ophthalmic solution, PLEASE SEE ATTACHED FOR DETAILED DIRECTIONS, Disp: , Rfl:     Allergies:  Allergies   Allergen Reactions   • Penicillins Shortness Of Breath   • Other Other (See Comments)     Seasonal       Medical history:  Past Medical History:   Diagnosis Date   • Allergic     Seasonal, mild   • Hypernatremia    • Pneumonia 2017   • S/P colonoscopy with polypectomy    • Visual impairment 2015    Cataracts       Surgical History:  Past Surgical History:   Procedure Laterality Date   •  SECTION  2002   •  SECTION  09/10/2004   • COLONOSCOPY  2015    Dr. Turcios   • COLONOSCOPY N/A 2018    Procedure: COLONOSCOPY TO CECUM AND TERMINAL ILEUM;  Surgeon: Nallely Turcios MD;  Location: [facility] ENDOSCOPY;  Service: Gastroenterology   • WISDOM TOOTH EXTRACTION         Family History:  Family History   Problem Relation Age of Onset   • Ovarian cancer Mother    • Colon cancer Mother    • Early death Mother         Ovarian and colon cancer   • Prostate cancer Father    • Heart attack Father 59   • Hyperlipidemia Father         Controlled with medication   • No Known Problems Sister    • No Known Problems Brother    • No Known Problems Daughter    • No Known Problems Son    • No Known Problems Maternal Aunt    • No Known Problems Paternal Aunt    • Parkinsonism Maternal Grandmother    • Dementia Paternal Grandmother    • Stroke Paternal Grandmother         Potential mini strokes   • Prostate cancer Paternal Grandfather    • Colon cancer Paternal Grandfather    • Breast cancer Other    • Colon cancer Other    • BRCA 1/2 Neg Hx    • Endometrial cancer Neg Hx        Social History:   Social History     Socioeconomic History   • Marital status:    Tobacco Use   • Smoking status: Former Smoker     Packs/day: 0.00     Years: 3.00     Pack years: 0.00     Types: Cigarettes     Start date: 1984     Quit date:      Years since quittin.2   • Smokeless tobacco: Never Used  "  Vaping Use   • Vaping Use: Never used   Substance and Sexual Activity   • Alcohol use: Yes     Comment: Moderate   • Drug use: No   • Sexual activity: Not Currently     Partners: Male     Birth control/protection: None     Patient drinks 3 servings of caffeine per day.       GYNECOLOGIC HISTORY:   . P: 2. AB: 0.  Last menstrual period: age 47  Age at menarche: 11  Age at first childbirth: 32  Lactation/How lon months  Age at menopause: 47  Total years of oral contraceptive use: 25  Total years of hormone replacement therapy: n/a      Physical Exam  Vitals:    03/15/22 1430   BP: 107/72   Pulse: 59   /72 (BP Location: Right arm)   Pulse 59   Ht 157.5 cm (62\")   Wt 59.9 kg (132 lb)   LMP  (LMP Unknown)   BMI 24.14 kg/m²     ECOG 0 - Asymptomatic  General: NAD, well appearing  Psych: a&o x 3, normal mood and affect  Eyes: EOMI, no scleral icterus  ENMT: neck supple without masses or thyromegaly, mucus membranes moist  Resp: normal effort, CTAB  CV: RRR, no murmurs, no edema   GI: soft, NT, ND  MSK: normal gait, normal ROM in bilateral shoulders  Lymph nodes:  no cervical, supraclavicular or axillary lymphadenopathy  Breast: symmetric, small volume, nodular tissue bilaterally  Right:  No visible abnormalities on inspection while seated, with arms raised or hands on hips. No masses, skin changes, or nipple abnormalities.  Left:  No visible abnormalities on inspection while seated, with arms raised or hands on hips. No masses, skin changes, or nipple abnormalities.  At area of palpable concern, 0800, 3 CFN a peak of nodular breast tissue is noted.      Assessment:    1)  51 y.o. F with left breast palpable area of concern, negative imaging    2)  benign breast changes    3)  Breast density    4)  Family history of ovarian cancer        Discussion:  1)  Imaging findings were reviewed with the patient, a copy was given.  No abnormality noted at area of concern left breast.    2)  We discussed " fibrocystic change and how this is benign and can sometimes be associated with increased breast density. I reassured her today that she had a normal clinical breast exam and recent normal bilateral imaging. I explained that cords of dense breast tissue or focal areas of fibrocystic change can sometimes feel like a lump on exam. This is common in women like her with heterogeneous and nodular tissue. I encouraged her to become familiar with her own self-exam over the next few months to establish a personal baseline.    3)  Her breast tissue most recently has shown scattered densities but in recent past heterogeneously dense.  Breast density describes how the breasts look on a mammogram.  Breast and connective tissue are denser than fat and this difference shows up on the mammogram.  Young women often have dense breasts.  As we age, breast become less dense.  Dense breast can make it harder to find breast cancer on the mammogram.  Women with high breast density have an increased risk of breast cancer.  Educational materials regarding breast density were given and reviewed.  Tomosynthesis imaging will be completed with next screening study.    4)  We discussed her family history including her mother with ovarian cancer at age 37, her expanded panel genetic testing is negative for mutation.      Plan:    In view of her normal imaging and examination I will not give her a follow-up in our office. I have asked her to check her self-exam and to call us in the interim with any concerns and I would be happy to see her back.    She will be due her bilateral screening mammogram in 10/22.    JOAN Hampton      CC:  MD Ruben Mccoy Mary S, MD EMR Dragon/transcription disclaimer:  Dictated using Dragon dictation

## 2022-06-23 ENCOUNTER — HOSPITAL ENCOUNTER (EMERGENCY)
Facility: HOSPITAL | Age: 52
Discharge: HOME | End: 2022-06-23
Attending: EMERGENCY MEDICINE | Admitting: EMERGENCY MEDICINE

## 2022-06-23 VITALS
SYSTOLIC BLOOD PRESSURE: 137 MMHG | RESPIRATION RATE: 16 BRPM | DIASTOLIC BLOOD PRESSURE: 91 MMHG | OXYGEN SATURATION: 98 % | TEMPERATURE: 98.7 F | HEART RATE: 78 BPM

## 2022-06-23 DIAGNOSIS — L25.9 CONTACT DERMATITIS, UNSPECIFIED CONTACT DERMATITIS TYPE, UNSPECIFIED TRIGGER: Primary | ICD-10-CM

## 2022-06-23 PROCEDURE — 99282 EMERGENCY DEPT VISIT SF MDM: CPT

## 2022-06-23 RX ORDER — PREDNISONE 20 MG/1
20 TABLET ORAL 2 TIMES DAILY
Qty: 14 TABLET | Refills: 0 | OUTPATIENT
Start: 2022-06-23 | End: 2022-07-22

## 2022-06-23 NOTE — ED PROVIDER NOTES
EMERGENCY DEPARTMENT ENCOUNTER    Room Number:  B10/10  Date of encounter:  6/23/2022  PCP: Dana Miranda MD  Historian: Patient      I used full protective equipment while examining this patient.  This includes face mask, gloves and protective eyewear.  I washed my hands before entering the room and immediately upon leaving the room      HPI:  Chief Complaint: Rash  A complete HPI/ROS/PMH/PSH/SH/FH are unobtainable due to: Nothing    Context: Irma Esposito is a 51 y.o. female who presents to the ED c/o approximate 1 week history of gradual onset, progressively worsening rash.  Patient first noticed this rash approximately 1 week ago.  She felt a burning, itchy sensation to her left forearm.  Patient noticed an itchy, red rash.  She has had similar rashes in the past.  She states she has had shingles approximately 4 times in the past.  She was seen at the [facility] 4 days ago and diagnosed with shingles.  She was started on acyclovir.  She states since that time the rash in her left forearm has worsened.  Has become bigger and redder.  It is more itchy and now has clear drainage.  She also states she has had similar eruptions on her right wrist, right groin, right lower leg.  She denies any fevers, chills.  She denies any diabetes.  Patient called her family doctor instructed her to come to the ER to rule out disseminated shingles versus Andrade-Callum syndrome.  Patient denies working in the yard or being outside recently.    Review of Medical Records  I reviewed urgent care office visit from 6/19/2022.    PAST MEDICAL HISTORY  Active Ambulatory Problems     Diagnosis Date Noted   • Hypernatremia    • Family history of colon cancer 09/29/2017   • FH: ovarian cancer 09/29/2017   • Colon cancer screening 06/27/2018   • Breast density 03/15/2022   • Fibrocystic breast changes, bilateral 03/15/2022     Resolved Ambulatory Problems     Diagnosis Date Noted   • No Resolved Ambulatory Problems     Past  Medical History:   Diagnosis Date   • Allergic    • Pneumonia 2017   • S/P colonoscopy with polypectomy    • Visual impairment          PAST SURGICAL HISTORY  Past Surgical History:   Procedure Laterality Date   •  SECTION  2002   •  SECTION  09/10/2004   • COLONOSCOPY  2015    Dr. Turcios   • COLONOSCOPY N/A 2018    Procedure: COLONOSCOPY TO CECUM AND TERMINAL ILEUM;  Surgeon: Nallely Turcios MD;  Location: [facility] ENDOSCOPY;  Service: Gastroenterology   • WISDOM TOOTH EXTRACTION           FAMILY HISTORY  Family History   Problem Relation Age of Onset   • Ovarian cancer Mother    • Colon cancer Mother    • Early death Mother         Ovarian and colon cancer   • Prostate cancer Father    • Heart attack Father 59   • Hyperlipidemia Father         Controlled with medication   • No Known Problems Sister    • No Known Problems Brother    • No Known Problems Daughter    • No Known Problems Son    • No Known Problems Maternal Aunt    • No Known Problems Paternal Aunt    • Parkinsonism Maternal Grandmother    • Dementia Paternal Grandmother    • Stroke Paternal Grandmother         Potential mini strokes   • Prostate cancer Paternal Grandfather    • Colon cancer Paternal Grandfather    • Breast cancer Other    • Colon cancer Other    • BRCA 1/2 Neg Hx    • Endometrial cancer Neg Hx          SOCIAL HISTORY  Social History     Socioeconomic History   • Marital status:    Tobacco Use   • Smoking status: Former Smoker     Packs/day: 0.00     Years: 3.00     Pack years: 0.00     Types: Cigarettes     Start date: 1984     Quit date:      Years since quittin.5   • Smokeless tobacco: Never Used   Vaping Use   • Vaping Use: Never used   Substance and Sexual Activity   • Alcohol use: Yes     Comment: Moderate   • Drug use: No   • Sexual activity: Not Currently     Partners: Male     Birth control/protection: None         ALLERGIES  Penicillins and Other        REVIEW OF  SYSTEMS  All systems reviewed and negative except for those discussed in HPI.       PHYSICAL EXAM    I have reviewed the triage vital signs and nursing notes.    ED Triage Vitals [06/23/22 1632]   Temp Heart Rate Resp BP SpO2   98.7 °F (37.1 °C) 96 16 144/91 98 %      Temp src Heart Rate Source Patient Position BP Location FiO2 (%)   Tympanic Monitor -- -- --       Physical Exam  GENERAL: Alert, oriented, not distressed  HENT: head atraumatic, no nuchal rigidity  EYES: no scleral icterus, EOMI  CV: regular rhythm, regular rate, no murmur  RESPIRATORY: normal effort, CTA  MUSCULOSKELETAL: no deformity, FROM, no calf swelling or tenderness  NEURO: alert, moves all extremities, follows commands  SKIN: Erythematous, grouped vesicular rash to the left forearm.  Mild surrounding erythema and weeping.  No abscess.  Similar rash also present on the right anterior wrist, right lower leg.      PROGRESS, DATA ANALYSIS, CONSULTS, AND MEDICAL DECISION MAKING    All labs have been independently reviewed by me.  All radiology studies have been reviewed by me and discussed with radiologist dictating the report.   EKG's independently viewed and interpreted by me.  Discussion below represents my analysis of pertinent findings related to patient's condition, differential diagnosis, treatment plan and final disposition.    I have discussed case with Dr. Tobar, emergency room physician.  He has performed his own bedside examination and agrees with treatment plan.    ED Course as of 06/23/22 1942   Thu Jun 23, 2022 1801 Patient presents with rash for the past week.  She has been to the needed care center and has had no improvement with acyclovir.  She was instructed by her family doctor to present to the ER.    Patient's rash certainly crosses midline.  The rash itself appears similar to poison ivy or contact dermatitis.  Plan to start patient on steroids.  No suspicion of disseminated shingles, Andrade-Callum syndrome, severe  allergic reaction. [EE]      ED Course User Index  [EE] Jack Rodriguez PA       AS OF 19:42 EDT VITALS:    BP - 137/91  HR - 78  TEMP - 98.7 °F (37.1 °C) (Tympanic)  O2 SATS - 98%        DIAGNOSIS  Final diagnoses:   Contact dermatitis, unspecified contact dermatitis type, unspecified trigger         DISPOSITION  Discharged      Dictated utilizing Dragon dictation     Jack Rodriguez PA  06/23/22 1943

## 2022-06-23 NOTE — ED PROVIDER NOTES
MD ATTESTATION NOTE    The DANIKA and I have discussed this patient's history, physical exam, and treatment plan.  I have reviewed the documentation and personally had a face to face interaction with the patient. I affirm the documentation and agree with the treatment and plan.  The attached note describes my personal findings.    I provided a substantive portion of the care of this patient. I personally performed the physical exam, in its entirety.    Irma Esposito is a 51 y.o. female who presents to the ED c/o having a rash on her arm.  She reports it has been present for the last week.  She went to the urgent care center and was put on acyclovir for zoster.  She reports since then it is red.  She reports she has had some rash on her left leg.  She states she talked to her primary care doctor who directed her to the emergency room.  She reports she has an upcoming eye surgery and she is concerned that it might get worse.  She denies any exposure to poison ivy or any other irritants.      On exam:  GENERAL: Awake, alert, no acute distress  SKIN: Warm, dry, pustular rash to the left forearm which is wet.  Similar rash to the right lateral lower leg.  HENT: Normocephalic, atraumatic  EYES: no scleral icterus  CV: regular rhythm, regular rate  RESPIRATORY: normal effort, lungs clear  ABDOMEN: soft, nontender, nondistended  MUSCULOSKELETAL: no deformity  NEURO: alert, moves all extremities, follows commands    Labs  No results found for this or any previous visit (from the past 24 hour(s)).    Radiology  No Radiology Exams Resulted Within Past 24 Hours    Medical Decision Making:  ED Course as of 06/23/22 2241   Thu Jun 23, 2022   1801 Patient presents with rash for the past week.  She has been to the needed care center and has had no improvement with acyclovir.  She was instructed by her family doctor to present to the ER.    Patient's rash certainly crosses midline.  The rash itself appears similar to poison ivy or  contact dermatitis.  Plan to start patient on steroids.  No suspicion of disseminated shingles, Andrade-Callum syndrome, severe allergic reaction. [EE]      ED Course User Index  [EE] Jack Rodriguez PA       Isolated to the left arm this appears that it could be shingles however with that now on the right leg as well I suspect more of a hypersensitivity reaction.  Plan to place her on steroids.  She has not had any fever or systemic symptoms.    PPE: The patient wore a mask and I wore an N95 mask throughout the entire patient encounter.      The patient has started, but not completed, their COVID-19 vaccination series.    Diagnosis  Final diagnoses:   Contact dermatitis, unspecified contact dermatitis type, unspecified trigger        Isidro Tobar MD  06/23/22 2566

## 2022-06-23 NOTE — ED NOTES
"Pt ambulatory to ED triage from . Pt reports PCP MD told her to come to ED. Pt was diagnosed with shingles on 6/19 and has been on antibiotics for 4 full days. Pt reports it is not getting better and spreading \"across midline.\" Denies fever, chills. Denies pain, reports itching.  "

## 2022-07-22 ENCOUNTER — TELEPHONE (OUTPATIENT)
Dept: OBSTETRICS AND GYNECOLOGY | Age: 52
End: 2022-07-22

## 2022-07-22 NOTE — TELEPHONE ENCOUNTER
Caller: Irma Esposito    Relationship to patient: Self    Best call back number: [phone]  OK TO CALL ANYTIME/LVM    Chief complaint: PT IS HAVING STINGING AND BURNING  PAIN DURING SEX, SOME BLOOD    Type of visit: GYN FOLLOW UP    Requested date: ASAP    Additional notes: PT IS CONCERNED BECAUSE THIS HAS NEVER HAPPENED BEFORE AND IT FEELS ABNORMAL    SHE STATED THAT SHE IS WILLING TO GO TO AN IMMEDIATE CARE BUT WOULD LIKE TO SEE DR MELO IF POSSIBLE    HUB NEXT AVAILABLE IS AUGUST 26TH  PT DOESN'T FEEL SAFE WAITING THAT LONG    I CHECKED [facility] AND SHE HAD AN APPT AVAILABLE ON AUG 1ST BUT PT WILL BE OUT OF TOWN THAT WEEK

## 2022-07-27 ENCOUNTER — OFFICE VISIT (OUTPATIENT)
Dept: OBSTETRICS AND GYNECOLOGY | Age: 52
End: 2022-07-27

## 2022-07-27 VITALS
WEIGHT: 131 LBS | DIASTOLIC BLOOD PRESSURE: 68 MMHG | HEIGHT: 62 IN | SYSTOLIC BLOOD PRESSURE: 122 MMHG | BODY MASS INDEX: 24.11 KG/M2

## 2022-07-27 DIAGNOSIS — N94.10 FEMALE DYSPAREUNIA: Primary | ICD-10-CM

## 2022-07-27 DIAGNOSIS — N94.9 VAGINAL BURNING: ICD-10-CM

## 2022-07-27 DIAGNOSIS — R39.15 URINARY URGENCY: ICD-10-CM

## 2022-07-27 DIAGNOSIS — N95.2 ATROPHIC VAGINITIS: ICD-10-CM

## 2022-07-27 PROCEDURE — 99213 OFFICE O/P EST LOW 20 MIN: CPT | Performed by: PHYSICIAN ASSISTANT

## 2022-07-27 RX ORDER — ESTRADIOL 0.1 MG/G
CREAM VAGINAL
Qty: 42.5 G | Refills: 3 | Status: SHIPPED | OUTPATIENT
Start: 2022-07-27

## 2022-07-27 NOTE — PROGRESS NOTES
"Subjective     Chief Complaint   Patient presents with   • Gynecologic Exam     C/o burning sensation during intercourse, (recently she has had a lot going on she had what she thought was shingles, then also had a fever blister, and a fungal infection on lower leg.)  no discharge, odor,  burning near opening and top. Has been seen at urgent care and they did urine culture and was neg.       Irma Esposito is a 51 y.o.  whose LMP is No LMP recorded (lmp unknown). Patient is postmenopausal. presents with painful IC    Noted more frequent need to urinate  No dysuria  Is noting burning sensation from start to finish  Denies lubrication issue-did try lubricant and it did not help    Has had recent dx of possible herpes and then had a mouth sore as well  Thought it was shingles which she has had before     Has changed her job and is under a lot of stress    Denies any sores  Not at high risk for std's  No d/c or odor           No Additional Complaints Reported    The following portions of the patient's history were reviewed and updated as appropriate:vital signs, allergies, current medications, past family history, past medical history, past social history, past surgical history and problem list      Review of Systems   Genitourinary:positive for dyspareunia     Objective      /68   Ht 157.5 cm (62\")   Wt 59.4 kg (131 lb)   LMP  (LMP Unknown)   Breastfeeding No   BMI 23.96 kg/m²     Physical Exam    General:   alert, comfortable and no distress   Heart: Not performed today   Lungs: Not performed today.   Breast: Not performed today   Neck: Not performed today   Abdomen: Not performed today   CVA: Not performed today   Pelvis: External genitalia: normal general appearance  Vaginal: normal rugae and atrophic mucosa  Cervix: normal appearance  No discretely pain with internal exam, notes \"burning\". No d/c or odor noted. Ph is noted to be increased   Extremities: Not performed today   Neurologic: " negative   Psychiatric: Normal affect, judgement, and mood       Lab Review   Labs: No data reviewed    Imaging   No data reviewed    Assessment & Plan     ASSESSMENT  1. Female dyspareunia    2. Urinary urgency    3. Vaginal burning    4. Atrophic vaginitis          PLAN  1.   Orders Placed This Encounter   Procedures   • Mycoplasma / Ureaplasma Culture - Urine, Urine, Clean Catch   • NuSwab BV & Candida - , Cervix       2. Medications prescribed this encounter:        New Medications Ordered This Visit   Medications   • estradiol (ESTRACE VAGINAL) 0.1 MG/GM vaginal cream     Sig: .5gms pv nightly x 2 wks then 3 times weekly     Dispense:  42.5 g     Refill:  3       3. R/o infection. Plan to start estrace in meantime. Could consider PT if pain persists.     Follow up: prn     ALBERT Mccullough  7/27/2022

## 2022-07-29 LAB
A VAGINAE DNA VAG QL NAA+PROBE: ABNORMAL SCORE
BVAB2 DNA VAG QL NAA+PROBE: ABNORMAL SCORE
C ALBICANS DNA VAG QL NAA+PROBE: NEGATIVE
C GLABRATA DNA VAG QL NAA+PROBE: NEGATIVE
MEGA1 DNA VAG QL NAA+PROBE: ABNORMAL SCORE

## 2022-08-01 RX ORDER — METRONIDAZOLE 500 MG/1
500 TABLET ORAL 2 TIMES DAILY
Qty: 14 TABLET | Refills: 0 | Status: SHIPPED | OUTPATIENT
Start: 2022-08-01 | End: 2022-08-08

## 2022-08-03 LAB
M HOMINIS SPEC QL CULT: NEGATIVE
U UREALYTICUM SPEC QL CULT: NEGATIVE

## 2022-08-04 ENCOUNTER — TELEPHONE (OUTPATIENT)
Dept: OBSTETRICS AND GYNECOLOGY | Age: 52
End: 2022-08-04

## 2022-08-04 NOTE — TELEPHONE ENCOUNTER
----- Message from ALBERT Jama sent at 8/3/2022  4:37 PM EDT -----  Let her know her ureaplasma and mycoplasma results are negative. I would suggest she c/w the estrace that I prescribed as she may find her urinary sx's may improve with this medication

## 2022-10-25 ENCOUNTER — OFFICE VISIT (OUTPATIENT)
Dept: OBSTETRICS AND GYNECOLOGY | Age: 52
End: 2022-10-25

## 2022-10-25 ENCOUNTER — PROCEDURE VISIT (OUTPATIENT)
Dept: OBSTETRICS AND GYNECOLOGY | Age: 52
End: 2022-10-25

## 2022-10-25 ENCOUNTER — APPOINTMENT (OUTPATIENT)
Dept: WOMENS IMAGING | Facility: HOSPITAL | Age: 52
End: 2022-10-25

## 2022-10-25 VITALS
HEIGHT: 62 IN | WEIGHT: 135.4 LBS | DIASTOLIC BLOOD PRESSURE: 60 MMHG | SYSTOLIC BLOOD PRESSURE: 108 MMHG | BODY MASS INDEX: 24.92 KG/M2

## 2022-10-25 DIAGNOSIS — Z12.31 VISIT FOR SCREENING MAMMOGRAM: Primary | ICD-10-CM

## 2022-10-25 DIAGNOSIS — Z01.419 ENCOUNTER FOR GYNECOLOGICAL EXAMINATION: Primary | ICD-10-CM

## 2022-10-25 PROBLEM — J30.2 SEASONAL ALLERGIES: Status: ACTIVE | Noted: 2022-10-25

## 2022-10-25 PROBLEM — H26.9 CATARACTS, BILATERAL: Status: ACTIVE | Noted: 2022-10-25

## 2022-10-25 PROCEDURE — 99396 PREV VISIT EST AGE 40-64: CPT | Performed by: OBSTETRICS & GYNECOLOGY

## 2022-10-25 PROCEDURE — 77067 SCR MAMMO BI INCL CAD: CPT | Performed by: RADIOLOGY

## 2022-10-25 PROCEDURE — 77067 SCR MAMMO BI INCL CAD: CPT | Performed by: OBSTETRICS & GYNECOLOGY

## 2022-10-25 PROCEDURE — 77063 BREAST TOMOSYNTHESIS BI: CPT | Performed by: OBSTETRICS & GYNECOLOGY

## 2022-10-25 PROCEDURE — 77063 BREAST TOMOSYNTHESIS BI: CPT | Performed by: RADIOLOGY

## 2022-10-25 NOTE — PROGRESS NOTES
.  Subjective     Chief Complaint   Patient presents with   • Gynecologic Exam     Annual exam, Last Pap 2020 NEG, HPV NEG, Mammogram today, pt has no complaints today        History of Present Illness    Irma Esposito is a 52 y.o.  who presents for annual exam.  She has some mild stinging with intercourse at times. She started using estrogen cream sparingly and it has helped. She denies vaginal bleeding or pelvic pain.    Her oldest is at [employer] and loves it. Her younger son is planning to go to [employer].   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 Patient is postmenopausal.         Current contraception: post menopausal status  History of abnormal Pap smear: no  Received Gardasil immunization: no  Perform regular self breast exam: yes  Family history of uterine or ovarian cancer: yes - mother ovarian--pt has had neg My Risk testing  Family History of colon cancer: yes - mother and pat gf  Family history of breast cancer: yes - mat great aunt    Mammogram: done today.  Colonoscopy: up to date, done  and due 5 years per Dr. Turcios  DEXA: not indicated.    Exercise: moderately active  Calcium/Vitamin D: adequate intake    The following portions of the patient's history were reviewed and updated as appropriate: allergies, current medications, past family history, past medical history, past social history, past surgical history and problem list.    Review of Systems    Review of Systems   Constitutional: Negative for fatigue.   Respiratory: Negative for shortness of breath.    Gastrointestinal: Negative for abdominal pain.   Genitourinary: Negative for vag bleeding or pelvic pain  Neurological: Negative for headaches.  "  Psychiatric/Behavioral: Negative for dysphoric mood.         Objective   Physical Exam    /60   Ht 157.5 cm (62\")   Wt 61.4 kg (135 lb 6.4 oz)   LMP  (LMP Unknown)   BMI 24.76 kg/m²   General:   Alert, in no distress   Heart: regular rate and rhythm   Lungs: clear to auscultation bilaterally   Breast: Inspection negative; no masses, retractions, nipple discharge or axillary adenopathy in either breast   Neck: Supple, no thyromegaly   Abdomen: Soft, no tenderness or guarding   Pelvis: External genitalia: normal general appearance  Urinary system: urethral meatus normal  Vaginal: normal mucosa without prolapse or lesions  Cervix: normal appearance  Adnexa: no masses or tenderness  Uterus: normal, nontender   Extremities: Normal without edema   Neurologic: Alert and oriented   Psychiatric: Normal affect, judgment and mood     Assessment & Plan   Diagnoses and all orders for this visit:    1. Encounter for gynecological examination (Primary)        All questions answered.  Breast self exam technique reviewed and patient encouraged to perform self-exam monthly.  Discussed healthy lifestyle modifications.  Recommended 30 minutes of aerobic exercise five times per week.  Discussed calcium needs to prevent osteoporosis.  Pap def as up to date and pt agrees, advised her to call if she does not receive results of mammogram within 2 weeks    Discussed Replens for vaginal dryness and recommend she use estrogen cream sparingly and she agrees.            "

## 2023-08-04 DIAGNOSIS — Z12.31 ENCOUNTER FOR SCREENING MAMMOGRAM FOR MALIGNANT NEOPLASM OF BREAST: Primary | ICD-10-CM

## 2023-09-26 RX ORDER — VALACYCLOVIR HYDROCHLORIDE 500 MG/1
500 TABLET, FILM COATED ORAL EVERY 8 HOURS PRN
COMMUNITY
Start: 2023-07-31

## 2023-09-27 ENCOUNTER — ANESTHESIA EVENT (OUTPATIENT)
Dept: GASTROENTEROLOGY | Facility: HOSPITAL | Age: 53
End: 2023-09-27
Payer: COMMERCIAL

## 2023-09-27 ENCOUNTER — ANESTHESIA (OUTPATIENT)
Dept: GASTROENTEROLOGY | Facility: HOSPITAL | Age: 53
End: 2023-09-27
Payer: COMMERCIAL

## 2023-09-27 ENCOUNTER — HOSPITAL ENCOUNTER (OUTPATIENT)
Facility: HOSPITAL | Age: 53
Setting detail: HOSPITAL OUTPATIENT SURGERY
Discharge: HOME | End: 2023-09-27
Attending: SURGERY | Admitting: SURGERY
Payer: COMMERCIAL

## 2023-09-27 VITALS
HEART RATE: 54 BPM | HEIGHT: 62 IN | OXYGEN SATURATION: 100 % | WEIGHT: 140.1 LBS | RESPIRATION RATE: 18 BRPM | TEMPERATURE: 97.5 F | BODY MASS INDEX: 25.78 KG/M2 | SYSTOLIC BLOOD PRESSURE: 117 MMHG | DIASTOLIC BLOOD PRESSURE: 71 MMHG

## 2023-09-27 PROCEDURE — 45378 DIAGNOSTIC COLONOSCOPY: CPT | Performed by: SURGERY

## 2023-09-27 PROCEDURE — 25010000002 PROPOFOL 10 MG/ML EMULSION: Performed by: ANESTHESIOLOGY

## 2023-09-27 PROCEDURE — 25010000002 GLUCAGON (RDNA) PER 1 MG: Performed by: SURGERY

## 2023-09-27 RX ORDER — IBUPROFEN 600 MG/1
TABLET ORAL AS NEEDED
Status: DISCONTINUED | OUTPATIENT
Start: 2023-09-27 | End: 2023-09-27 | Stop reason: HOSPADM

## 2023-09-27 RX ORDER — LIDOCAINE HYDROCHLORIDE 20 MG/ML
INJECTION, SOLUTION INFILTRATION; PERINEURAL AS NEEDED
Status: DISCONTINUED | OUTPATIENT
Start: 2023-09-27 | End: 2023-09-27 | Stop reason: SURG

## 2023-09-27 RX ORDER — SODIUM CHLORIDE, SODIUM LACTATE, POTASSIUM CHLORIDE, CALCIUM CHLORIDE 600; 310; 30; 20 MG/100ML; MG/100ML; MG/100ML; MG/100ML
30 INJECTION, SOLUTION INTRAVENOUS CONTINUOUS PRN
Status: DISCONTINUED | OUTPATIENT
Start: 2023-09-27 | End: 2023-09-27 | Stop reason: HOSPADM

## 2023-09-27 RX ORDER — PROPOFOL 10 MG/ML
VIAL (ML) INTRAVENOUS AS NEEDED
Status: DISCONTINUED | OUTPATIENT
Start: 2023-09-27 | End: 2023-09-27 | Stop reason: SURG

## 2023-09-27 RX ADMIN — LIDOCAINE HYDROCHLORIDE 40 MG: 20 INJECTION, SOLUTION INFILTRATION; PERINEURAL at 06:59

## 2023-09-27 RX ADMIN — SODIUM CHLORIDE, POTASSIUM CHLORIDE, SODIUM LACTATE AND CALCIUM CHLORIDE 30 ML/HR: 600; 310; 30; 20 INJECTION, SOLUTION INTRAVENOUS at 06:47

## 2023-09-27 RX ADMIN — PROPOFOL 140 MG: 10 INJECTION, EMULSION INTRAVENOUS at 07:00

## 2023-09-27 NOTE — ANESTHESIA POSTPROCEDURE EVALUATION
"Patient: Irma Esposito    Procedure Summary       Date: 09/27/23 Room / Location: [facility] ENDOSCOPY 5 / [facility] ENDOSCOPY    Anesthesia Start: 0659 Anesthesia Stop: 0723    Procedure: COLONOSCOPY TO CECUM Diagnosis:       Screening for malignant neoplasm of colon      Family history of colon cancer      (Screening for malignant neoplasm of colon [Z12.11])      (Family history of colon cancer [Z80.0])    Surgeons: Nallely Turcios MD Provider: Shy Ordonez MD    Anesthesia Type: MAC ASA Status: 1            Anesthesia Type: MAC    Vitals  Vitals Value Taken Time   BP 97/63 09/27/23 0722   Temp     Pulse 63 09/27/23 0722   Resp 16 09/27/23 0722   SpO2 100 % 09/27/23 0722           Post Anesthesia Care and Evaluation    Patient location during evaluation: PHASE II  Patient participation: complete - patient participated  Level of consciousness: awake and alert  Pain management: adequate    Airway patency: patent  Anesthetic complications: No anesthetic complications    Cardiovascular status: acceptable  Respiratory status: acceptable  Hydration status: acceptable    Comments: BP 97/63   Pulse 63   Temp 36.4 °C (97.5 °F) (Oral)   Resp 16   Ht 157.5 cm (62\")   Wt 63.5 kg (140 lb 1.6 oz)   LMP  (LMP Unknown)   SpO2 100%   BMI 25.62 kg/m²       "

## 2023-09-27 NOTE — OP NOTE
Colonoscopy Procedure Note  Irma Esposito  1970  Date of Procedure: 09/27/23    Pre-operative Diagnosis:    Screening  mother with ovarian cancer, colon involved (?extension)  history of nonadenomatous polyp.     Post-operative Diagnosis:  Single ascending and sigmoid diverticula.    Procedure: Colonoscopy         Recommendations:   Colonoscopy in 5 years, based on mother's history and patient preference  Review diverticulosis info given today.  Keep a copy of the photographs of the procedure given to you today for possible need for reference in the future.      Surgeon: Tam    Anesthetic: MAC per Shy Ordonez MD    Scope Withdrawal Time:  6 minutes  28 seconds    Procedure Details     MAC anesthesia was induced.  The 180 Colonoscopy was inserted blindly into the rectum and advanced to the cecum, with relative ease,  without need for pressure, lift, or turning.    Cecum was identified by the appendiceal orifice and the ileocecal valve and photographed for documentation.      Prep quality was excellent.  A careful inspection was made as the scope was withdrawn, including a retroflexed view of the rectum; there was no suggestion of presence of angiodysplasias, colitis, or polyps but she did have the diverticula, with no interventions.     Retroflexion in the rectum revealed no abnormalities.      Nallely Turcios MD  09/27/23

## 2023-09-27 NOTE — ANESTHESIA PREPROCEDURE EVALUATION
Anesthesia Evaluation                  Airway   Mallampati: I  TM distance: >3 FB  Neck ROM: full  Dental - normal exam     Pulmonary    (+) a smoker Former,  (-) shortness of breath, recent URI  Cardiovascular     (+) dysrhythmias, hyperlipidemia  (-) angina      Neuro/Psych  GI/Hepatic/Renal/Endo    (-) liver disease, no renal disease, diabetes    Musculoskeletal     Abdominal    Substance History      OB/GYN          Other                      Anesthesia Plan    ASA 1     MAC     intravenous induction     Anesthetic plan, risks, benefits, and alternatives have been provided, discussed and informed consent has been obtained with: patient.    CODE STATUS:

## 2023-09-27 NOTE — H&P
Cc: Endoscopy Visit    HPI: 53 y.o. female here for screening with mother with ovarian cancer, colon involved (?extension) and prior history of nonadenomatous polyp.     Past Medical History:   Diagnosis Date    Allergic 2008    Seasonal, mild    Anxiety     Herpes zoster     Hyperlipidemia     Several years but primary care physician has not felt it so elevated treatment is needed    Hypernatremia     Ovarian cyst 2017    Resolved itself    Pneumonia ,     Spinal headache     Post     Urinary tract infection 2021    Varicella 1973       Past Surgical History:   Procedure Laterality Date    CATARACT EXTRACTION  2022 and 2022     SECTION  2002     SECTION  09/10/2004    COLONOSCOPY  2015    Dr. Turcios    COLONOSCOPY N/A 2018    Procedure: COLONOSCOPY TO CECUM AND TERMINAL ILEUM;  Surgeon: Nallely Turcios MD;  Location: [facility] ENDOSCOPY;  Service: Gastroenterology    WISDOM TOOTH EXTRACTION         is allergic to penicillins.       Medication List        ASK your doctor about these medications      docusate sodium 100 MG capsule  Commonly known as: COLACE     estradiol 0.1 MG/GM vaginal cream  Commonly known as: ESTRACE VAGINAL  .5gms pv nightly x 2 wks then 3 times weekly     fexofenadine 180 MG tablet  Commonly known as: ALLEGRA     GLUCOSAMINE CHONDR COMPLEX PO     multivitamin with minerals tablet tablet     TOTAL MEMORY & FOCUS FORMULA PO     valACYclovir 500 MG tablet  Commonly known as: VALTREX              Family History   Problem Relation Age of Onset    Ovarian cancer Mother     Colon cancer Mother     Early death Mother         Ovarian and colon cancer    Prostate cancer Father     Heart attack Father 59    Hyperlipidemia Father         Controlled with medication    Hypertension Father     No Known Problems Sister     No Known Problems Brother     No Known Problems Daughter     No Known Problems Son     No Known Problems Maternal Aunt     No Known  Problems Paternal Aunt     Parkinsonism Maternal Grandmother     Hypertension Maternal Grandmother     Dementia Paternal Grandmother     Stroke Paternal Grandmother         Potential mini strokes    Osteoporosis Paternal Grandmother     Prostate cancer Paternal Grandfather     Colon cancer Paternal Grandfather         Possible colon cancer, not confirmed    Breast cancer Other     Colon cancer Other     BRCA 1/2 Neg Hx     Endometrial cancer Neg Hx     Malig Hyperthermia Neg Hx        Social History     Socioeconomic History    Marital status:    Tobacco Use    Smoking status: Former     Packs/day: 0.00     Years: 3.00     Pack years: 0.00     Types: Cigarettes     Start date: 1984     Quit date: 1986     Years since quittin.7    Smokeless tobacco: Never   Vaping Use    Vaping Use: Never used   Substance and Sexual Activity    Alcohol use: Not Currently     Comment: Moderate/ 2 GLASSES PER DAY    Drug use: No    Sexual activity: Yes     Partners: Male     Birth control/protection: None       Vitals:    23 0627   BP: 105/63   Pulse: 67   Resp: 16   Temp: 97.5 °F (36.4 °C)   SpO2: 100%       Body mass index is 25.62 kg/m².    Physical Exam    General: No acute distress  Lungs: No labored breathing, Pulse oximetry on room air is 100%.  Heart/EKG: RRR  Abdomen: no complaints of pain  Mental:  Awake, alert, and oriented    Imp:     Screening  mother with ovarian cancer, colon involved (?extension)  history of nonadenomatous polyp.      Plan:  STEFANIE Turcios MD  07:00 EDT

## 2023-09-27 NOTE — DISCHARGE INSTRUCTIONS

## 2023-10-31 ENCOUNTER — HOSPITAL ENCOUNTER (OUTPATIENT)
Facility: HOSPITAL | Age: 53
Discharge: HOME | End: 2023-10-31
Admitting: OBSTETRICS & GYNECOLOGY
Payer: COMMERCIAL

## 2023-10-31 ENCOUNTER — OFFICE VISIT (OUTPATIENT)
Dept: OBSTETRICS AND GYNECOLOGY | Age: 53
End: 2023-10-31
Payer: COMMERCIAL

## 2023-10-31 VITALS
SYSTOLIC BLOOD PRESSURE: 112 MMHG | DIASTOLIC BLOOD PRESSURE: 68 MMHG | HEIGHT: 62 IN | WEIGHT: 140.4 LBS | BODY MASS INDEX: 25.83 KG/M2

## 2023-10-31 DIAGNOSIS — N95.1 SYMPTOMS, SUCH AS FLUSHING, SLEEPLESSNESS, HEADACHE, LACK OF CONCENTRATION, ASSOCIATED WITH THE MENOPAUSE: ICD-10-CM

## 2023-10-31 DIAGNOSIS — Z11.51 ENCOUNTER FOR SCREENING FOR HUMAN PAPILLOMAVIRUS (HPV): ICD-10-CM

## 2023-10-31 DIAGNOSIS — Z12.31 ENCOUNTER FOR SCREENING MAMMOGRAM FOR MALIGNANT NEOPLASM OF BREAST: ICD-10-CM

## 2023-10-31 DIAGNOSIS — Z01.419 ENCOUNTER FOR GYNECOLOGICAL EXAMINATION: Primary | ICD-10-CM

## 2023-10-31 PROCEDURE — 77063 BREAST TOMOSYNTHESIS BI: CPT

## 2023-10-31 PROCEDURE — 77067 SCR MAMMO BI INCL CAD: CPT

## 2023-10-31 RX ORDER — ESTRADIOL 0.1 MG/G
CREAM VAGINAL
Qty: 42.5 G | Refills: 3 | Status: SHIPPED | OUTPATIENT
Start: 2023-10-31

## 2023-10-31 RX ORDER — PROGESTERONE 100 MG/1
100 CAPSULE ORAL NIGHTLY
Qty: 30 CAPSULE | Refills: 3 | Status: SHIPPED | OUTPATIENT
Start: 2023-10-31

## 2023-10-31 NOTE — PROGRESS NOTES
.Subjective     Chief Complaint   Patient presents with    Gynecologic Exam     Annual exam, Last Pap 2020 NEG, HPV NEG, Mammogram today, Last Colonoscopy 2023, Pt has no complaints today, Doing well        History of Present Illness    Irma Esposito is a 53 y.o.  who presents for annual exam.  She denies any gyn issues. She denies vag bleeding or pelvic pain.   She occasionally uses estrogen vag cream.    She also notes issues with sleep. She notes some friends are taking progestin for sleep and she is interested.     She was dx with brief episode of SVT this year. The cardiologist did not recommend f/u unless symptoms recurred.     Her youngest son is doing engineering at [employer], oldest at [employer] and likes it     Obstetric History:  OB History          2    Para   2    Term   2            AB        Living   2         SAB        IAB        Ectopic        Molar        Multiple        Live Births   2               Menstrual History:     No LMP recorded (lmp unknown). Patient is postmenopausal.         Current contraception: post menopausal status  History of abnormal Pap smear: no  Received Gardasil immunization: no  Perform regular self breast exam:  yes  Family history of uterine or ovarian cancer: yes - mother --pt had neg My Risk in past  Family History of colon cancer: yes - mother and pat GF  Family history of breast cancer: yes - great aunt    Mammogram: scheduled today  Colonoscopy: up to date, done  and f/u 5 yrs per Dr. Turcios  DEXA: not indicated.    Exercise: moderately active  Calcium/Vitamin D: adequate intake    The following portions of the patient's history were reviewed and updated as appropriate: allergies, current medications, past family history, past medical history, past social history, past surgical history, and problem list.    Review of Systems    Review of Systems   Constitutional: Negative for fatigue.   Respiratory: Negative for shortness of breath.   "  Gastrointestinal: Negative for abdominal pain.   Genitourinary: Negative for vag bleeding or pelvic pain  Neurological: Negative for severe headaches.   Psychiatric/Behavioral: Negative for dysphoric mood.         Objective   Physical Exam    /68   Ht 157.5 cm (62\")   Wt 63.7 kg (140 lb 6.4 oz)   LMP  (LMP Unknown)   BMI 25.68 kg/m²   General:   Alert, in no distress   Heart: regular rate and rhythm   Lungs: clear to auscultation bilaterally   Breast: Inspection negative; no masses, retractions, nipple discharge or axillary adenopathy in either breast   Neck: Supple, no thyromegaly   Abdomen: Soft, no tenderness or guarding   Pelvis: External genitalia: normal general appearance  Urinary system: urethral meatus normal  Vaginal: normal mucosa without prolapse or lesions  Cervix: normal appearance  Adnexa: no masses or tenderness  Uterus: normal, nontender   Extremities: Normal without edema   Neurologic: Alert and oriented   Psychiatric: Normal affect, judgment and mood     Assessment & Plan   Diagnoses and all orders for this visit:    1. Encounter for gynecological examination (Primary)  -     IGP, Apt HPV,rfx 16 / 18,45    2. Encounter for screening for human papillomavirus (HPV)  -     IGP, Apt HPV,rfx 16 / 18,45    3. Symptoms, such as flushing, sleeplessness, headache, lack of concentration, associated with the menopause    Other orders  -     Progesterone (Prometrium) 100 MG capsule; Take 1 capsule by mouth Every Night.  Dispense: 30 capsule; Refill: 3  -     estradiol (ESTRACE VAGINAL) 0.1 MG/GM vaginal cream; .5gms pv nightly x 2 wks then 3 times weekly  Dispense: 42.5 g; Refill: 3        All questions answered.  Breast self exam technique reviewed and patient encouraged to perform self-exam monthly.  Discussed healthy lifestyle modifications.  Recommended 30 minutes of aerobic exercise five times per week.  Discussed calcium needs to prevent osteoporosis.  Advised pt to call if she does not " receive results of pap and mammogram within 2 weeks    Discussed HRT and offered to begin combined therapy. Pt reports she does not really have significant hot flashes and is concerned about increased DVT/PE related to estrogen therapy. She would like to try prometrium. Reviewed increased risk breast cancer and possible cardiovascular disease related to progesterone. She notes understanding. Rx prometrium with instructions. Plan f/u 3 months or prn. She would like to continue estrogen vaginal cream which she uses sparingly.

## 2024-01-29 ENCOUNTER — TELEPHONE (OUTPATIENT)
Dept: OBSTETRICS AND GYNECOLOGY | Age: 54
End: 2024-01-29
Payer: COMMERCIAL

## 2024-01-29 NOTE — TELEPHONE ENCOUNTER
Patient is needing her Progesterone and Estradiol sent to the [facility] due to a change in her insurance plan. She has an appt.on Friday,2/2/24 for follow up. Thank you.

## 2024-01-29 NOTE — TELEPHONE ENCOUNTER
Pt requesting Rx be sent through [facility], medication in chart and pharmacy changed, Please advise

## 2024-01-30 RX ORDER — ESTRADIOL 0.1 MG/G
CREAM VAGINAL
Qty: 42.5 G | Refills: 3 | Status: SHIPPED | OUTPATIENT
Start: 2024-01-30

## 2024-01-30 RX ORDER — PROGESTERONE 100 MG/1
100 CAPSULE ORAL NIGHTLY
Qty: 30 CAPSULE | Refills: 3 | Status: SHIPPED | OUTPATIENT
Start: 2024-01-30 | End: 2024-02-02 | Stop reason: SDUPTHER

## 2024-02-02 ENCOUNTER — OFFICE VISIT (OUTPATIENT)
Dept: OBSTETRICS AND GYNECOLOGY | Age: 54
End: 2024-02-02
Payer: COMMERCIAL

## 2024-02-02 VITALS
HEIGHT: 62 IN | BODY MASS INDEX: 25.76 KG/M2 | DIASTOLIC BLOOD PRESSURE: 68 MMHG | SYSTOLIC BLOOD PRESSURE: 110 MMHG | WEIGHT: 140 LBS

## 2024-02-02 DIAGNOSIS — Z12.31 ENCOUNTER FOR SCREENING MAMMOGRAM FOR MALIGNANT NEOPLASM OF BREAST: ICD-10-CM

## 2024-02-02 DIAGNOSIS — N95.1 SYMPTOMS, SUCH AS FLUSHING, SLEEPLESSNESS, HEADACHE, LACK OF CONCENTRATION, ASSOCIATED WITH THE MENOPAUSE: Primary | ICD-10-CM

## 2024-02-02 RX ORDER — PROGESTERONE 100 MG/1
100 CAPSULE ORAL NIGHTLY
Qty: 90 CAPSULE | Refills: 3 | Status: SHIPPED | OUTPATIENT
Start: 2024-02-02

## 2024-02-12 ENCOUNTER — TELEPHONE (OUTPATIENT)
Dept: OBSTETRICS AND GYNECOLOGY | Age: 54
End: 2024-02-12
Payer: COMMERCIAL

## 2024-08-13 ENCOUNTER — TELEPHONE (OUTPATIENT)
Dept: OBSTETRICS AND GYNECOLOGY | Age: 54
End: 2024-08-13

## 2024-08-13 NOTE — TELEPHONE ENCOUNTER
Caller: Irma Esposito    Relationship to patient: Self    Best call back number: [phone]     Chief complaint: MAMMOGRAM    Requested date: AFTER 11/14/24. NOT AVAILABLE MORNING OF 11/22/24.    If rescheduling, when is the original appointment: 11/11/24 AT 11:45 AM.    Additional notes:PATIENT WILL BE OUT OF TOWN 11/11/24. HUB RESCHEDULED ANNUAL FOR FIRST AVAILABLE 04/15/24. PATIENT REQUESTED TO SEE  ONLY. WOULD LIKE TO RESCHEDULE MAMMOGRAM FOR ANOTHER DAY IN NOVEMBER.

## 2024-11-05 ENCOUNTER — HOSPITAL ENCOUNTER (OUTPATIENT)
Facility: HOSPITAL | Age: 54
Discharge: HOME | End: 2024-11-05
Admitting: OBSTETRICS & GYNECOLOGY
Payer: COMMERCIAL

## 2024-11-05 DIAGNOSIS — Z12.31 ENCOUNTER FOR SCREENING MAMMOGRAM FOR MALIGNANT NEOPLASM OF BREAST: ICD-10-CM

## 2024-11-05 PROCEDURE — 77063 BREAST TOMOSYNTHESIS BI: CPT

## 2024-11-05 PROCEDURE — 77067 SCR MAMMO BI INCL CAD: CPT

## 2025-01-23 RX ORDER — PROGESTERONE 100 MG/1
100 CAPSULE ORAL NIGHTLY
Qty: 90 CAPSULE | Refills: 0 | Status: SHIPPED | OUTPATIENT
Start: 2025-01-23

## 2025-04-01 RX ORDER — PROGESTERONE 100 MG/1
100 CAPSULE ORAL NIGHTLY
Qty: 90 CAPSULE | Refills: 0 | Status: SHIPPED | OUTPATIENT
Start: 2025-04-01

## 2025-04-15 ENCOUNTER — OFFICE VISIT (OUTPATIENT)
Dept: OBSTETRICS AND GYNECOLOGY | Age: 55
End: 2025-04-15
Payer: COMMERCIAL

## 2025-04-15 VITALS
WEIGHT: 137.6 LBS | BODY MASS INDEX: 25.32 KG/M2 | HEIGHT: 62 IN | DIASTOLIC BLOOD PRESSURE: 74 MMHG | SYSTOLIC BLOOD PRESSURE: 104 MMHG

## 2025-04-15 DIAGNOSIS — Z82.62 FAMILY HISTORY OF OSTEOPOROSIS: ICD-10-CM

## 2025-04-15 DIAGNOSIS — Z01.419 ENCOUNTER FOR GYNECOLOGICAL EXAMINATION: Primary | ICD-10-CM

## 2025-04-15 DIAGNOSIS — Z12.31 ENCOUNTER FOR SCREENING MAMMOGRAM FOR MALIGNANT NEOPLASM OF BREAST: ICD-10-CM

## 2025-04-15 DIAGNOSIS — Z78.0 MENOPAUSE: ICD-10-CM

## 2025-04-15 PROBLEM — R92.30 BREAST DENSITY: Status: RESOLVED | Noted: 2022-03-15 | Resolved: 2025-04-15

## 2025-04-15 RX ORDER — ESTRADIOL 0.1 MG/G
CREAM VAGINAL
Qty: 42.5 G | Refills: 3 | Status: SHIPPED | OUTPATIENT
Start: 2025-04-15

## 2025-04-15 RX ORDER — ESTRADIOL 0.1 MG/G
CREAM VAGINAL
Qty: 42.5 G | Refills: 3 | Status: SHIPPED | OUTPATIENT
Start: 2025-04-15 | End: 2025-04-15 | Stop reason: SDUPTHER

## 2025-04-15 NOTE — PROGRESS NOTES
Subjective     Chief Complaint   Patient presents with    Gynecologic Exam     Annual. Patient has no concerns.   Last Pap - 10/31/2023 Neg/HPV Neg  Mammo - 2024  Colonoscopy - 2023       History of Present Illness    Irma Esposito is a 54 y.o.  who presents for annual exam.  She denies vag bleeding/pelvic pain or any issues.   She plans to wean off prometrium as not impacting sleep much anymore. She desires to continue estrogen vag cream.    Her son will graduate from [employer]. He will doing graduate work in [place] in ecology  Her younger son is studying engineering at [employer] and doing well.     Obstetric History:  OB History          2    Para   2    Term   1       1    AB        Living   2         SAB        IAB        Ectopic        Molar        Multiple        Live Births   2               Menstrual History:     No LMP recorded (lmp unknown). Patient is postmenopausal.         Current contraception: post menopausal status  History of abnormal Pap smear: no  Received Gardasil immunization: no  Perform regular self breast exam:  yes  Family history of uterine or ovarian cancer: yes - mother-ovary --pt had neg My Risk  Family History of colon cancer: yes - mother and pat GF  Family history of breast cancer: yes - great aunt    Mammogram: up to date, neg 2024  Colonoscopy: up to date, done 2023, f/u 5 yrs per Dr. Turcios  DEXA: ordered due to family hx osteoporosis in [place]    Exercise: moderately active  Calcium/Vitamin D: adequate intake    The following portions of the patient's history were reviewed and updated as appropriate: allergies, current medications, past family history, past medical history, past social history, past surgical history, and problem list.    Review of Systems    Review of Systems   Constitutional: Negative for fatigue.   Respiratory: Negative for shortness of breath.    Gastrointestinal: Negative for abdominal pain.   Genitourinary: Negative for vag  "bleeding or pelvic pain  Neurological: Negative for severe headaches.   Psychiatric/Behavioral: Negative for dysphoric mood.         Objective   Physical Exam    /74 (BP Location: Right arm, Patient Position: Sitting, Cuff Size: Adult)   Ht 157.5 cm (62\")   Wt 62.4 kg (137 lb 9.6 oz)   LMP  (LMP Unknown)   BMI 25.17 kg/m²   General:   Alert, in no distress   Heart: regular rate and rhythm   Lungs: clear to auscultation bilaterally   Breast: Inspection is negative. Left breast is without masses, retractions, nipple discharge or axillary adenopathy. Right breast is without masses, retractions, nipple discharge or axillary adenopathy.     Neck: Supple, no thyromegaly   Abdomen: Soft, no tenderness or guarding   Pelvis: External genitalia: normal general appearance  Urinary system: urethral meatus normal  Vaginal: atrophic mucosa  Cervix: normal appearance  Adnexa: no masses or tenderness  Uterus: normal, nontender   Extremities: Normal without edema   Neurologic: Alert and oriented   Psychiatric: Normal affect, judgment and mood     Assessment & Plan   Diagnoses and all orders for this visit:    1. Encounter for gynecological examination (Primary)    2. Encounter for screening mammogram for malignant neoplasm of breast  -     Mammo Screening Digital Tomosynthesis Bilateral With CAD; Future    3. Family history of osteoporosis  -     DEXA Bone Density Axial; Future    4. Menopause  -     DEXA Bone Density Axial; Future    Other orders  -     Discontinue: estradiol (ESTRACE VAGINAL) 0.1 MG/GM vaginal cream; Apply a pea size amount to vagina 3 times weekly  Dispense: 42.5 g; Refill: 3  -     estradiol (ESTRACE VAGINAL) 0.1 MG/GM vaginal cream; Apply a pea size amount to vagina 3 times weekly  Dispense: 42.5 g; Refill: 3        All questions answered.  Breast self exam technique reviewed and patient encouraged to perform self-exam monthly.  Discussed healthy lifestyle modifications.  Recommended 30 minutes of " aerobic exercise five times per week.  Discussed calcium/ Vit D needs to prevent osteoporosis.  Pap def as up to date and pt agrees. Ordered mammogram due in the fall and will book with bone density.

## 2025-07-02 RX ORDER — PROGESTERONE 100 MG/1
100 CAPSULE ORAL NIGHTLY
Qty: 90 CAPSULE | Refills: 0 | Status: CANCELLED | OUTPATIENT
Start: 2025-07-02

## 2025-07-16 RX ORDER — PROGESTERONE 100 MG/1
100 CAPSULE ORAL NIGHTLY
Qty: 90 CAPSULE | Refills: 3 | OUTPATIENT
Start: 2025-07-16

## 2025-07-16 RX ORDER — PROGESTERONE 100 MG/1
100 CAPSULE ORAL NIGHTLY
Qty: 90 CAPSULE | Refills: 0 | Status: SHIPPED | OUTPATIENT
Start: 2025-07-16

## (undated) DEVICE — KT ORCA ORCAPOD DISP STRL

## (undated) DEVICE — TUBING, SUCTION, 1/4" X 10', STRAIGHT: Brand: MEDLINE

## (undated) DEVICE — THE TORRENT IRRIGATION SCOPE CONNECTOR IS USED WITH THE TORRENT IRRIGATION TUBING TO PROVIDE IRRIGATION FLUIDS SUCH AS STERILE WATER DURING GASTROINTESTINAL ENDOSCOPIC PROCEDURES WHEN USED IN CONJUNCTION WITH AN IRRIGATION PUMP (OR ELECTROSURGICAL UNIT).: Brand: TORRENT

## (undated) DEVICE — SENSR O2 OXIMAX FNGR A/ 18IN NONSTR

## (undated) DEVICE — Device: Brand: DEFENDO AIR/WATER/SUCTION AND BIOPSY VALVE

## (undated) DEVICE — ADAPT CLN BIOGUARD AIR/H2O DISP

## (undated) DEVICE — CANN O2 ETCO2 FITS ALL CONN CO2 SMPL A/ 7IN DISP LF

## (undated) DEVICE — LN SMPL CO2 SHTRM SD STREAM W/M LUER

## (undated) DEVICE — CANN NASL CO2 TRULINK W/O2 A/